# Patient Record
Sex: FEMALE | Race: WHITE | NOT HISPANIC OR LATINO | Employment: FULL TIME | URBAN - METROPOLITAN AREA
[De-identification: names, ages, dates, MRNs, and addresses within clinical notes are randomized per-mention and may not be internally consistent; named-entity substitution may affect disease eponyms.]

---

## 2024-02-19 ENCOUNTER — HOSPITAL ENCOUNTER (OUTPATIENT)
Dept: RADIOLOGY | Facility: HOSPITAL | Age: 64
Discharge: HOME | End: 2024-02-19
Payer: COMMERCIAL

## 2024-02-19 DIAGNOSIS — M16.11 UNILATERAL PRIMARY OSTEOARTHRITIS, RIGHT HIP: ICD-10-CM

## 2024-02-19 PROCEDURE — 73700 CT LOWER EXTREMITY W/O DYE: CPT | Mod: RT

## 2024-02-19 PROCEDURE — 73700 CT LOWER EXTREMITY W/O DYE: CPT | Mod: RIGHT SIDE | Performed by: RADIOLOGY

## 2024-05-30 ENCOUNTER — EDUCATION (OUTPATIENT)
Dept: ORTHOPEDIC SURGERY | Facility: CLINIC | Age: 64
End: 2024-05-30
Payer: COMMERCIAL

## 2024-05-30 NOTE — GROUP NOTE
In addition to the group class activities, Susannah CAMPA Sulmaalexandria had the following lab work completed:  No orders of the defined types were placed in this encounter.      A new History and Physical was not completed.    This class lasted approximately 1 hour and had 13 participants. The nurse instructor covered the following topics:  Overview of joint replacement surgery.  Instructions for at-home preparation for surgery (included below).  Expectations before and after surgery including hospital stay.  Discharge planning and home care options.  Physical therapy overview and review of at-home exercises.    Information for Surgery or Hospital Stay  For morning surgery, do not eat or drink after midnight. This includes water, mints, and chewing gum.  For afternoon surgery, you may have a clear liquid breakfast before 6 A.M. Clear liquids are anything you can see through, like apple juice, cranberry juice, tea or black coffee without cream. Do not eat or drink after 6 A.M. This includes water.  Wear loose fitting clothes--something that can be slipped on and off easily over a dressing.  Bring a walker or crutches with you to the hospital on the day of surgery.  Please inform the office if you have any symptoms of illness or fever prior to surgery.  You need to have transportation home when discharged, as you will be medicated.  STOP any aspirin or anti-inflammatory products (Aleve, Advil, etc.) 5-7 days before surgery.  You may use Tylenol or Extra Strength Tylenol.  STOP any vitamins or herbal products 10 days before surgery.

## 2024-06-03 ENCOUNTER — PRE-ADMISSION TESTING (OUTPATIENT)
Dept: PREADMISSION TESTING | Facility: HOSPITAL | Age: 64
End: 2024-06-03
Payer: COMMERCIAL

## 2024-06-03 VITALS
BODY MASS INDEX: 29.79 KG/M2 | OXYGEN SATURATION: 97 % | WEIGHT: 178.79 LBS | DIASTOLIC BLOOD PRESSURE: 86 MMHG | HEART RATE: 76 BPM | TEMPERATURE: 98.2 F | SYSTOLIC BLOOD PRESSURE: 137 MMHG | RESPIRATION RATE: 18 BRPM | HEIGHT: 65 IN

## 2024-06-03 DIAGNOSIS — Z01.818 PREOPERATIVE EXAMINATION: Primary | ICD-10-CM

## 2024-06-03 LAB
ABO GROUP (TYPE) IN BLOOD: NORMAL
ALBUMIN SERPL BCP-MCNC: 4.4 G/DL (ref 3.4–5)
ALP SERPL-CCNC: 94 U/L (ref 33–136)
ALT SERPL W P-5'-P-CCNC: 17 U/L (ref 7–45)
AMORPH CRY #/AREA UR COMP ASSIST: ABNORMAL /HPF
ANION GAP SERPL CALC-SCNC: 9 MMOL/L (ref 10–20)
ANTIBODY SCREEN: NORMAL
APPEARANCE UR: CLEAR
AST SERPL W P-5'-P-CCNC: 19 U/L (ref 9–39)
BACTERIA #/AREA URNS AUTO: ABNORMAL /HPF
BASOPHILS # BLD AUTO: 0.04 X10*3/UL (ref 0–0.1)
BASOPHILS NFR BLD AUTO: 0.5 %
BILIRUB SERPL-MCNC: 0.6 MG/DL (ref 0–1.2)
BILIRUB UR STRIP.AUTO-MCNC: NEGATIVE MG/DL
BUN SERPL-MCNC: 11 MG/DL (ref 6–23)
CALCIUM SERPL-MCNC: 9.3 MG/DL (ref 8.6–10.3)
CHLORIDE SERPL-SCNC: 101 MMOL/L (ref 98–107)
CO2 SERPL-SCNC: 31 MMOL/L (ref 21–32)
COLOR UR: ABNORMAL
CREAT SERPL-MCNC: 0.78 MG/DL (ref 0.5–1.05)
EGFRCR SERPLBLD CKD-EPI 2021: 87 ML/MIN/1.73M*2
EOSINOPHIL # BLD AUTO: 0.2 X10*3/UL (ref 0–0.7)
EOSINOPHIL NFR BLD AUTO: 2.7 %
ERYTHROCYTE [DISTWIDTH] IN BLOOD BY AUTOMATED COUNT: 12 % (ref 11.5–14.5)
GLUCOSE SERPL-MCNC: 122 MG/DL (ref 74–99)
GLUCOSE UR STRIP.AUTO-MCNC: NORMAL MG/DL
HCT VFR BLD AUTO: 40.3 % (ref 36–46)
HGB BLD-MCNC: 13.5 G/DL (ref 12–16)
IMM GRANULOCYTES # BLD AUTO: 0.02 X10*3/UL (ref 0–0.7)
IMM GRANULOCYTES NFR BLD AUTO: 0.3 % (ref 0–0.9)
KETONES UR STRIP.AUTO-MCNC: NEGATIVE MG/DL
LEUKOCYTE ESTERASE UR QL STRIP.AUTO: ABNORMAL
LYMPHOCYTES # BLD AUTO: 1.98 X10*3/UL (ref 1.2–4.8)
LYMPHOCYTES NFR BLD AUTO: 26.3 %
MCH RBC QN AUTO: 30.9 PG (ref 26–34)
MCHC RBC AUTO-ENTMCNC: 33.5 G/DL (ref 32–36)
MCV RBC AUTO: 92 FL (ref 80–100)
MONOCYTES # BLD AUTO: 0.68 X10*3/UL (ref 0.1–1)
MONOCYTES NFR BLD AUTO: 9 %
NEUTROPHILS # BLD AUTO: 4.62 X10*3/UL (ref 1.2–7.7)
NEUTROPHILS NFR BLD AUTO: 61.2 %
NITRITE UR QL STRIP.AUTO: NEGATIVE
NRBC BLD-RTO: 0 /100 WBCS (ref 0–0)
PH UR STRIP.AUTO: 6 [PH]
PLATELET # BLD AUTO: 254 X10*3/UL (ref 150–450)
POTASSIUM SERPL-SCNC: 3.8 MMOL/L (ref 3.5–5.3)
PROT SERPL-MCNC: 6.9 G/DL (ref 6.4–8.2)
PROT UR STRIP.AUTO-MCNC: NEGATIVE MG/DL
RBC # BLD AUTO: 4.37 X10*6/UL (ref 4–5.2)
RBC # UR STRIP.AUTO: NEGATIVE /UL
RBC #/AREA URNS AUTO: ABNORMAL /HPF
RH FACTOR (ANTIGEN D): NORMAL
SODIUM SERPL-SCNC: 137 MMOL/L (ref 136–145)
SP GR UR STRIP.AUTO: 1.01
SQUAMOUS #/AREA URNS AUTO: ABNORMAL /HPF
UROBILINOGEN UR STRIP.AUTO-MCNC: NORMAL MG/DL
WBC # BLD AUTO: 7.5 X10*3/UL (ref 4.4–11.3)
WBC #/AREA URNS AUTO: ABNORMAL /HPF

## 2024-06-03 PROCEDURE — 80053 COMPREHEN METABOLIC PANEL: CPT

## 2024-06-03 PROCEDURE — 85025 COMPLETE CBC W/AUTO DIFF WBC: CPT

## 2024-06-03 PROCEDURE — 81001 URINALYSIS AUTO W/SCOPE: CPT

## 2024-06-03 PROCEDURE — 87081 CULTURE SCREEN ONLY: CPT | Mod: 59,GEALAB

## 2024-06-03 PROCEDURE — 36415 COLL VENOUS BLD VENIPUNCTURE: CPT

## 2024-06-03 PROCEDURE — 87086 URINE CULTURE/COLONY COUNT: CPT | Mod: GEALAB

## 2024-06-03 PROCEDURE — 93010 ELECTROCARDIOGRAM REPORT: CPT | Performed by: INTERNAL MEDICINE

## 2024-06-03 PROCEDURE — 93005 ELECTROCARDIOGRAM TRACING: CPT

## 2024-06-03 PROCEDURE — 86901 BLOOD TYPING SEROLOGIC RH(D): CPT

## 2024-06-03 RX ORDER — BISMUTH SUBSALICYLATE 262 MG
1 TABLET,CHEWABLE ORAL DAILY
COMMUNITY

## 2024-06-03 RX ORDER — PANTOPRAZOLE SODIUM 40 MG/1
40 TABLET, DELAYED RELEASE ORAL
COMMUNITY

## 2024-06-03 RX ORDER — CHLORHEXIDINE GLUCONATE ORAL RINSE 1.2 MG/ML
15 SOLUTION DENTAL DAILY
Qty: 30 ML | Refills: 0 | Status: SHIPPED | OUTPATIENT
Start: 2024-06-03 | End: 2024-06-05

## 2024-06-03 RX ORDER — CHLORHEXIDINE GLUCONATE 40 MG/ML
1 SOLUTION TOPICAL DAILY
Start: 2024-06-03 | End: 2024-06-08

## 2024-06-03 RX ORDER — OMEPRAZOLE 20 MG/1
20 CAPSULE, DELAYED RELEASE ORAL NIGHTLY
COMMUNITY

## 2024-06-03 ASSESSMENT — ENCOUNTER SYMPTOMS
GASTROINTESTINAL NEGATIVE: 1
EYES NEGATIVE: 1
ENDOCRINE NEGATIVE: 1
CARDIOVASCULAR NEGATIVE: 1
CONSTITUTIONAL NEGATIVE: 1
RHINORRHEA: 1
RESPIRATORY NEGATIVE: 1
NEUROLOGICAL NEGATIVE: 1

## 2024-06-03 ASSESSMENT — DUKE ACTIVITY SCORE INDEX (DASI)
CAN YOU DO LIGHT WORK AROUND THE HOUSE LIKE DUSTING OR WASHING DISHES: YES
CAN YOU DO YARD WORK LIKE RAKING LEAVES, WEEDING OR PUSHING A MOWER: YES
CAN YOU PARTICIPATE IN MODERATE RECREATIONAL ACTIVITIES LIKE GOLF, BOWLING, DANCING, DOUBLES TENNIS OR THROWING A BASEBALL OR FOOTBALL: NO
CAN YOU HAVE SEXUAL RELATIONS: YES
CAN YOU TAKE CARE OF YOURSELF (EAT, DRESS, BATHE, OR USE TOILET): YES
TOTAL_SCORE: 44.7
CAN YOU RUN A SHORT DISTANCE: YES
CAN YOU CLIMB A FLIGHT OF STAIRS OR WALK UP A HILL: YES
CAN YOU DO MODERATE WORK AROUND THE HOUSE LIKE VACUUMING, SWEEPING FLOORS OR CARRYING GROCERIES: YES
CAN YOU PARTICIPATE IN STRENOUS SPORTS LIKE SWIMMING, SINGLES TENNIS, FOOTBALL, BASKETBALL, OR SKIING: NO
CAN YOU WALK INDOORS, SUCH AS AROUND YOUR HOUSE: YES
CAN YOU DO HEAVY WORK AROUND THE HOUSE LIKE SCRUBBING FLOORS OR LIFTING AND MOVING HEAVY FURNITURE: YES
DASI METS SCORE: 8.2
CAN YOU WALK A BLOCK OR TWO ON LEVEL GROUND: YES

## 2024-06-03 ASSESSMENT — ACTIVITIES OF DAILY LIVING (ADL): ADL_SCORE: 0

## 2024-06-03 ASSESSMENT — PAIN - FUNCTIONAL ASSESSMENT: PAIN_FUNCTIONAL_ASSESSMENT: 0-10

## 2024-06-03 ASSESSMENT — LIFESTYLE VARIABLES: SMOKING_STATUS: NONSMOKER

## 2024-06-03 ASSESSMENT — PAIN SCALES - GENERAL: PAINLEVEL_OUTOF10: 0 - NO PAIN

## 2024-06-03 NOTE — H&P (VIEW-ONLY)
CPM/PAT Evaluation       Name: Susannah Avalos (Susannah Avalos)  /Age: 1960/63 y.o.     Visit Type:   In-Person       Chief Complaint: osteoarthritis of right hip    HPI  64 y/o female scheduled for Robot arthroplasty total hip - right on 2024 with  Dr. Santillan secondary to osteoarthritis.  PMHX includes osteoarthritis, GERD.  PAT is consulted today for perioperative risk stratification and optimization.      Past Medical History:   Diagnosis Date    Arthritis     CKD (chronic kidney disease) stage 2, GFR 60-89 ml/min     GERD (gastroesophageal reflux disease)     Hyperlipidemia     PONV (postoperative nausea and vomiting)     Vitamin D deficiency     Wears contact lenses     Wears glasses        Past Surgical History:   Procedure Laterality Date     SECTION, LOW TRANSVERSE      COLONOSCOPY      X2       Patient  has no history on file for sexual activity.    No family history on file.    No Known Allergies    Prior to Admission medications    Not on File        PAT ROS:   Constitutional:   neg    Neuro/Psych:   neg    Eyes:   neg    Ears:   Nose:    nasal discharge  Mouth:   neg    Throat:   neg    Neck:   Cardio:   neg    Respiratory:   neg    Endocrine:   neg    GI:   neg    :   neg    Musculoskeletal:    Pain in right hip  Hematologic:   neg    Skin:  neg        Physical Exam  Constitutional:       Appearance: Normal appearance.   HENT:      Head: Normocephalic and atraumatic.      Mouth/Throat:      Mouth: Mucous membranes are moist.      Pharynx: Oropharynx is clear.   Eyes:      Extraocular Movements: Extraocular movements intact.      Pupils: Pupils are equal, round, and reactive to light.   Cardiovascular:      Rate and Rhythm: Normal rate and regular rhythm.   Pulmonary:      Effort: Pulmonary effort is normal.      Breath sounds: Normal breath sounds.   Abdominal:      General: Bowel sounds are normal.      Palpations: Abdomen is soft.   Musculoskeletal:         General: Normal  range of motion.      Cervical back: Normal range of motion and neck supple.   Skin:     General: Skin is warm and dry.   Neurological:      General: No focal deficit present.      Mental Status: She is alert and oriented to person, place, and time.   Psychiatric:         Mood and Affect: Mood normal.         Behavior: Behavior normal.          PAT AIRWAY:   Airway:     Mallampati::  II    Neck ROM::  Full  normal      06/03/24    Visit Vitals  /86   Pulse 76   Temp 36.8 °C (98.2 °F)   Resp 18       DASI Risk Score      Flowsheet Row Most Recent Value   DASI SCORE 44.7   METS Score (Will be calculated only when all the questions are answered) 8.2          Caprini DVT Assessment      Flowsheet Row Most Recent Value   DVT Score 6   Current Status Major surgery planned, lasting 2-3 hours   Age 60-75 years   BMI 30 or less          Modified Frailty Index    No data to display       CHADS2 Stroke Risk  Current as of 41 minutes ago        N/A 3 to 100%: High Risk   2 to < 3%: Medium Risk   0 to < 2%: Low Risk     Last Change: N/A          This score determines the patient's risk of having a stroke if the patient has atrial fibrillation.        This score is not applicable to this patient. Components are not calculated.          Revised Cardiac Risk Index      Flowsheet Row Most Recent Value   Revised Cardiac Risk Calculator 0          Apfel Simplified Score      Flowsheet Row Most Recent Value   Apfel Simplified Score Calculator 4          Risk Analysis Index Results This Encounter         6/3/2024  1337             CASTRO Cancer History: Patient does not indicate history of cancer    Total Risk Analysis Index Score Without Cancer: 18    Total Risk Analysis Index Score: 18          Stop Bang Score      Flowsheet Row Most Recent Value   Do you snore loudly? 1   Do you often feel tired or fatigued after your sleep? 0   Has anyone ever observed you stop breathing in your sleep? 0   Do you have or are you being treated for  high blood pressure? 0   Recent BMI (Calculated) 0   Age older than 50 years old? 1=Yes   Is your neck circumference greater than 17 inches (Male) or 16 inches (Female)? 0   Gender - Male 0=No                Assessment and Plan:     HPI  62 y/o female scheduled for Robot arthroplasty total hip - right on 6/17/2024 with  Dr. Santillan secondary to osteoarthritis .  PMHX includes osteoarthritis, GERD.  PAT is consulted today for perioperative risk stratification and optimization.      Neuro:  No neurologic diagnosis or significant findings on chart review, clinical presentation and evaluation.  No grossly apparent neurologic perioperative risk.  Patient is not at increased risk for perioperative CVA      HEENT:  No HEENT diagnosis or significant findings on chart review or clinical presentation and evaluation. No further preoperative testing/intervention indicated at this time.    Cardiovascular:  No CV diagnosis or significant findings on chart review or clinical presentation and evaluation. No further preoperative testing or intervention is indicated at this time.  METS: 8.2  RCRI: 0 points, 3.9%  risk for postoperative MACE   ROB: 0.0% risk for 30 day postoperative MACE  EKG - as above    Pulmonary:  No pulmonary diagnosis, however patient is at increased risk of perioperative complications secondary to  age > 60  Stop Bang score is 0 placing patient at low risk for IGNACIA  ARISCAT: <26 points, 1.6% risk of in-hospital postoperative pulmonary complication  PRODIGY: Low risk for opioid induced respiratory depression    Renal:   No renal diagnosis, however patient is at increase risk for perioperative renal complications secondary to  Age equal to or greater than 56      Endocrine:  No endocrine diagnosis or significant findings on chart review or clinical presentation and evaluation. No further testing or intervention is indicated at this time.    Hematologic:  No hematologic diagnosis, however patient is at an increased  risk for DVT    Caprini Score 6, patient at High risk for perioperative DVT.  Patient provided with VTE education/handout.    Gastrointestinal:   No GI diagnosis or significant findings on chart review or clinical presentation and evaluation.   Apfel 4      Ortho:  Follows with Dr. Santillan  CT Hip: 2/20/2024  Plan is for Roel Robot Arthroplasty total hip anterior - Right     Infectious disease:   No infectious diagnosis or significant findings on chart review or clinical presentation and evaluation.   Prescription provided for CHG body wash and dental rinse. CHG use instructions reviewed and provided to patient.  Staph screen collected    Musculoskeletal:   No diagnosis or significant findings on chart review or clinical presentation and evaluation.     Anesthesia/Airway:  PONV      Medication instructions and NPO guidelines reviewed with the patient.  All questions or concerns discussed and addressed.      Labs and EKG ordered   BMP and CBC w/diff

## 2024-06-03 NOTE — PREPROCEDURE INSTRUCTIONS
Thank you for visiting Preadmission Testing (PAT) today for your pre-procedure evaluation, you were seen by     Mandy Singer CNP  Pre Admission Testing  [facility]  [phone]    This summary includes instructions and information to aid you during your perioperative period.  Please read carefully. If you have any questions about your visit today, please call the number listed above.  If you become ill or have any changes to your health before your surgery, please contact your primary care provider and alert your surgeon.    Preparing for your Surgery       Exercises  Preoperative Deep Breathing Exercises  Why it is important to do deep breathing exercises before my surgery?  Deep breathing exercises strengthen your breathing muscles.  This helps you to recover after your surgery and decreases the chance of breathing complications.  How are the deep breathing exercises done?  Sit straight with your back supported.  Breathe in deeply and slowly through your nose. Your lower rib cage should expand and your abdomen may move forward.  Hold that breath for 3 to 5 seconds.  Breathe out through pursed lips, slowly and completely.  Rest and repeat 10 times every hour while awake.  Rest longer if you become dizzy or lightheaded.       Preoperative Brain Exercises    What are brain exercises?  A brain exercise is any activity that engages your thinking (cognitive) skills.    What types of activities are considered brain exercises?  Jigsaw puzzles, crossword puzzles, word jumble, memory games, word search, and many more.  Many can be found free online or on your phone via a mobile kg.    Why should I do brain exercises before my surgery?  More recent research has shown brain exercise before surgery can lower the risk of postoperative delirium (confusion) which can be especially important for older adults.  Patients who did brain exercises for 5 to 10 hours the days before surgery, cut their risk  of postoperative delirium in half up to 1 week after surgery.    Sit-to-Stand Exercise    What is the sit-to-stand exercise?  The sit-to-stand exercise strengthens the muscles of your lower body and muscles in the center of your body (core muscles for stability) helping to maintain and improve your strength and mobility.  How do I do the sit-to-stand exercise?  The goal is to do this exercise without using your arms or hands.  If this is too difficult, use your arms and hands or a chair with armrests to help slowly push yourself to the standing position and lower yourself back to the sitting position. As the movement becomes easier use your arms and hands less.    Steps to the sit-to-stand exercise  Sit up tall in a sturdy chair, knees bent, feet flat on the floor shoulder-width apart.  Shift your hips/pelvis forward in the chair to correctly position yourself for the next movement.  Lean forward at your hips.  Stand up straight putting equal weight on both feet.  Check to be sure you are properly aligned with the chair, in a slow controlled movement sit back down.  Repeat this exercise 10-15 times.  If needed you can do it fewer times until your strength improves.  Rest for 1 minute.  Do another 10-15 sit-to-stand exercises.  Try to do this in the morning and evening.        Instructions    Preoperative Fasting Guidelines    Why must I stop eating and drinking near surgery time?  With sedation, food or liquid in your stomach can enter your lungs causing serious complications  Food can increase nausea and vomiting  When do I need to stop eating and drinking before my surgery?      Do not eat any food or drink any liquids after midnight the night before your surgery/procedure.  You may have sips of water to take medications.            Simple things you can do to help prevent blood clots     Blood clots are blockages that can form in the body's veins. When a blood clot forms in your deep veins, it may be called a  deep vein thrombosis, or DVT for short. Blood clots can happen in any part of the body where blood flows, but they are most common in the arms and legs. If a piece of a blood clot breaks free and travels to the lungs, it is called a pulmonary embolus (PE). A PE can be a very serious problem.         Being in the hospital or having surgery can raise your chances of getting a blood clot because you may not be well enough to move around as much as you normally do.         Ways you can help prevent blood clots in the hospital       Wearing SCDs  SCDs stands for Sequential Compression Devices.   SCDs are special sleeves that wrap around your legs. They attach to a pump that fills them with air to gently squeeze your legs every few minutes.  This helps return the blood in your legs to your heart.   SCDs should only be taken off when walking or bathing. SCDs may not be comfortable, but they can help save your life.              Pump SCD leg sleeves  Wearing compression stockings - if your doctor orders them. These special snug-fitting stockings gently squeeze your legs to help blood flow.       Walking. Walking helps move the blood in your legs.   If your doctor says it is ok, try walking the halls at least   5 times a day. Ask us to help you get up, so you don't fall.      Taking any blood-thinning medicines your doctor orders.              Ways you can help prevent blood clots at home         Wearing compression stockings - if your doctor orders them.   Walking - to help move the blood in your legs.    Taking any blood-thinning medicines your doctor orders.      Signs of a blood clot or PE    Tell your doctor or nurse right away if you have any of the problems listed below.         If you are at home, seek medical care right away. Call 911 for chest pain or problems breathing.            Signs of a blood clot (DVT) - such as pain, swelling, redness, or warmth in your arm or legs.  Signs of a pulmonary embolism (PE) -  "such as chest pain or feeling short of breath      Tobacco and Alcohol;  Do not drink alcohol or smoke within 24 hours of surgery.  It is best to quit smoking for as long as possible before any surgery or procedure.    Other Instructions  Why did I have my nose, under my arms, and groin swabbed? The purpose of the swab is to identify Staphylococcus aureus inside your nose or on your skin.  The swab was sent to the laboratory for culture.  A positive swab/culture for Staphylococcus aureus is called colonization or carriage.     What is Staphylococcus aureus? Staphylococcus aureus, also known as \"staph\", is a germ found on the skin or in the nose of healthy people.  Sometimes Staphylococcus aureus can get into the body and cause an infection.  This can be minor (such as pimples, boils, or other skin problems).  It might also be serious (such as a blood infection, pneumonia, or a surgical site infection).     What is Staphylococcus aureus colonization or carriage? Colonization or carriage means that a person has the germ but is not sick from it.  These bacteria can be spread on the hands or when breathing or sneezing.   How is Staphylococcus aureus spread? It is most often spread by close contact with a person or item that carries it.   What happens if my culture is positive for Staphylococcus aureus? Your doctor/medical team will use this information to guide any antibiotic treatment which may be necessary.  Regardless of the culture results, we will clean the inside of your nose with a betadine swab just before you have your surgery.   Will I get an infection if I have Staphylococcus aureus in my nose or on my skin? Anyone can get an infection with Staphylococcus aureus.  However, the best way to reduce your risk of infection is to follow the instructions provided to you for the use of your CHG soap and dental rinse.      Body Wash:     What is a home preoperative antibacterial shower? This shower is a way of " cleaning the skin with a germ-killing solution before surgery.  The solution contains chlorhexidine, commonly known as CHG.  CHG is a skin cleanser with germ-killing ability.  Let your doctor know if you are allergic to chlorhexidine.   Why do I need to take a preoperative antibacterial shower? Skin is not sterile.  It is best to try to make your skin as free of germs as possible before surgery.  Proper cleansing with a germ-killing soap before surgery can lower the number of germs on your skin.  This helps to reduce the risk of infection at the surgical site.    Following the instructions listed below will help you prepare your skin for surgery.   How do I use the solution? Steps:  Begin using your CHG soap 5 days before your scheduled surgery on __6/17/2024___.          Oral/Dental Rinse:     What is oral/dental rinse?  It is a mouthwash. It is a way of cleaning the mouth with a germ-killing solution before your surgery.  The solution contains chlorhexidine, commonly known as CHG. It is used inside the mouth to kill a bacteria known as Staphylococcus aureus.  Let your doctor know if you are allergic to Chlorhexidine.   Why do I need to use CHG oral/dental rinse? The CHG oral/dental rinse helps to kill a bacteria in your mouth known as Staphylococcus aureus.  This reduces the risk of infection at the surgical site.    Using your CHG oral/dental rinse STEPS: Use your CHG oral/dental rinse after you brush your teeth the night before (at bedtime) and the morning of your surgery.  Follow all directions on your prescription label.    Use the cap on the container to measure 15 ml.  Swish (gargle if you can) the mouthwash in your mouth for at least 30 seconds, (do not swallow) and spit out.  After you use your CHG rinse, do not rinse your mouth with water, drink or eat.    Please refer to the prescription label for the appropriate time to resume oral intake   What side effects might I have using the CHG oral/dental rinse?  CHG rinse will stick to plaque on the teeth.  Brush and floss just before use.  Teeth brushing will help avoid staining of plaque during use.          The Week before Surgery        Seven days before Surgery  Check your PAT medication instructions  Do the exercises provided to you by PAT  Arrange for a responsible, adult licensed  to take you home after surgery and stay with you for 24 hours.  You will not be permitted to drive yourself home if you have received any anesthetic/sedation  Six days before surgery  Check your PAT medication instructions  Do the exercises provided to you by PAT  Start using Chlorhexidene (CHG) body wash if prescribed  Five days before surgery  Check your PAT medication instructions  Do the exercises provided to you by PAT  Continue to use CHG body wash if prescribed  Three days before surgery  Check your PAT medication instructions  Do the exercises provided to you by PAT  Continue to use CHG body wash if prescribed  Two days before surgery  Check your PAT medication instructions  Do the exercises provided to you by PAT  Continue to use CHG body wash if prescribed    The Day before Surgery       Check your PAT medication and all other PAT instructions including when to stop eating and drinking  You will be called with your arrival time for surgery in the late afternoon.  If you do not receive a call please reach out to your surgeon's office.  Do not smoke or drink 24 hours before surgery  Prepare items to bring with you to the hospital  Shower with your chlorhexidine wash if prescribed  Brush your teeth and use your chlorhexidine dental rinse if prescribed    The Day of Surgery       Check your PAT medication instructions  Ensure you follow the instructions for when to stop eating and drinking  Shower, if prescribed use CHG.  Do not apply any lotions, creams, moisturizers, perfume or deodorant  Brush your teeth and use your CHG dental rinse if prescribed  Wear loose comfortable  clothing  Avoid make-up  Remove  jewelry and piercings, consider professional piercing removal with a plastic spacer if needed  Bring photo ID and Insurance card  Bring an accurate medication list that includes medication dose, frequency and allergies  Bring a copy of your advanced directives (will, health care power of )  Bring any devices and controllers as well as medical devices you have been provided with for surgery (CPAP, slings, braces, etc.)  Dentures, eyeglasses, and contacts will be removed before surgery, please bring cases for contacts or glasses

## 2024-06-03 NOTE — CPM/PAT H&P
CPM/PAT Evaluation       Name: Susannah Avalos (Susannah Avalos)  /Age: 1960/63 y.o.     Visit Type:   In-Person       Chief Complaint: osteoarthritis of right hip    HPI  62 y/o female scheduled for Robot arthroplasty total hip - right on 2024 with  Dr. Santillan secondary to osteoarthritis.  PMHX includes osteoarthritis, GERD.  PAT is consulted today for perioperative risk stratification and optimization.      Past Medical History:   Diagnosis Date    Arthritis     CKD (chronic kidney disease) stage 2, GFR 60-89 ml/min     GERD (gastroesophageal reflux disease)     Hyperlipidemia     PONV (postoperative nausea and vomiting)     Vitamin D deficiency     Wears contact lenses     Wears glasses        Past Surgical History:   Procedure Laterality Date     SECTION, LOW TRANSVERSE      COLONOSCOPY      X2       Patient  has no history on file for sexual activity.    No family history on file.    No Known Allergies    Prior to Admission medications    Not on File        PAT ROS:   Constitutional:   neg    Neuro/Psych:   neg    Eyes:   neg    Ears:   Nose:    nasal discharge  Mouth:   neg    Throat:   neg    Neck:   Cardio:   neg    Respiratory:   neg    Endocrine:   neg    GI:   neg    :   neg    Musculoskeletal:    Pain in right hip  Hematologic:   neg    Skin:  neg        Physical Exam  Constitutional:       Appearance: Normal appearance.   HENT:      Head: Normocephalic and atraumatic.      Mouth/Throat:      Mouth: Mucous membranes are moist.      Pharynx: Oropharynx is clear.   Eyes:      Extraocular Movements: Extraocular movements intact.      Pupils: Pupils are equal, round, and reactive to light.   Cardiovascular:      Rate and Rhythm: Normal rate and regular rhythm.   Pulmonary:      Effort: Pulmonary effort is normal.      Breath sounds: Normal breath sounds.   Abdominal:      General: Bowel sounds are normal.      Palpations: Abdomen is soft.   Musculoskeletal:         General: Normal  range of motion.      Cervical back: Normal range of motion and neck supple.   Skin:     General: Skin is warm and dry.   Neurological:      General: No focal deficit present.      Mental Status: She is alert and oriented to person, place, and time.   Psychiatric:         Mood and Affect: Mood normal.         Behavior: Behavior normal.          PAT AIRWAY:   Airway:     Mallampati::  II    Neck ROM::  Full  normal      06/03/24    Visit Vitals  /86   Pulse 76   Temp 36.8 °C (98.2 °F)   Resp 18       DASI Risk Score      Flowsheet Row Most Recent Value   DASI SCORE 44.7   METS Score (Will be calculated only when all the questions are answered) 8.2          Caprini DVT Assessment      Flowsheet Row Most Recent Value   DVT Score 6   Current Status Major surgery planned, lasting 2-3 hours   Age 60-75 years   BMI 30 or less          Modified Frailty Index    No data to display       CHADS2 Stroke Risk  Current as of 41 minutes ago        N/A 3 to 100%: High Risk   2 to < 3%: Medium Risk   0 to < 2%: Low Risk     Last Change: N/A          This score determines the patient's risk of having a stroke if the patient has atrial fibrillation.        This score is not applicable to this patient. Components are not calculated.          Revised Cardiac Risk Index      Flowsheet Row Most Recent Value   Revised Cardiac Risk Calculator 0          Apfel Simplified Score      Flowsheet Row Most Recent Value   Apfel Simplified Score Calculator 4          Risk Analysis Index Results This Encounter         6/3/2024  1337             CASTRO Cancer History: Patient does not indicate history of cancer    Total Risk Analysis Index Score Without Cancer: 18    Total Risk Analysis Index Score: 18          Stop Bang Score      Flowsheet Row Most Recent Value   Do you snore loudly? 1   Do you often feel tired or fatigued after your sleep? 0   Has anyone ever observed you stop breathing in your sleep? 0   Do you have or are you being treated for  high blood pressure? 0   Recent BMI (Calculated) 0   Age older than 50 years old? 1=Yes   Is your neck circumference greater than 17 inches (Male) or 16 inches (Female)? 0   Gender - Male 0=No                Assessment and Plan:     HPI  64 y/o female scheduled for Robot arthroplasty total hip - right on 6/17/2024 with  Dr. Santillan secondary to osteoarthritis .  PMHX includes osteoarthritis, GERD.  PAT is consulted today for perioperative risk stratification and optimization.      Neuro:  No neurologic diagnosis or significant findings on chart review, clinical presentation and evaluation.  No grossly apparent neurologic perioperative risk.  Patient is not at increased risk for perioperative CVA      HEENT:  No HEENT diagnosis or significant findings on chart review or clinical presentation and evaluation. No further preoperative testing/intervention indicated at this time.    Cardiovascular:  No CV diagnosis or significant findings on chart review or clinical presentation and evaluation. No further preoperative testing or intervention is indicated at this time.  METS: 8.2  RCRI: 0 points, 3.9%  risk for postoperative MACE   ROB: 0.0% risk for 30 day postoperative MACE  EKG - as above    Pulmonary:  No pulmonary diagnosis, however patient is at increased risk of perioperative complications secondary to  age > 60  Stop Bang score is 0 placing patient at low risk for IGNACIA  ARISCAT: <26 points, 1.6% risk of in-hospital postoperative pulmonary complication  PRODIGY: Low risk for opioid induced respiratory depression    Renal:   No renal diagnosis, however patient is at increase risk for perioperative renal complications secondary to  Age equal to or greater than 56      Endocrine:  No endocrine diagnosis or significant findings on chart review or clinical presentation and evaluation. No further testing or intervention is indicated at this time.    Hematologic:  No hematologic diagnosis, however patient is at an increased  risk for DVT    Caprini Score 6, patient at High risk for perioperative DVT.  Patient provided with VTE education/handout.    Gastrointestinal:   No GI diagnosis or significant findings on chart review or clinical presentation and evaluation.   Apfel 4      Ortho:  Follows with Dr. Santillan  CT Hip: 2/20/2024  Plan is for Roel Robot Arthroplasty total hip anterior - Right     Infectious disease:   No infectious diagnosis or significant findings on chart review or clinical presentation and evaluation.   Prescription provided for CHG body wash and dental rinse. CHG use instructions reviewed and provided to patient.  Staph screen collected    Musculoskeletal:   No diagnosis or significant findings on chart review or clinical presentation and evaluation.     Anesthesia/Airway:  PONV      Medication instructions and NPO guidelines reviewed with the patient.  All questions or concerns discussed and addressed.      Labs and EKG ordered   BMP and CBC w/diff

## 2024-06-04 LAB
BACTERIA UR CULT: NORMAL
HOLD SPECIMEN: NORMAL

## 2024-06-05 LAB — STAPHYLOCOCCUS SPEC CULT: NORMAL

## 2024-06-06 LAB
ATRIAL RATE: 76 BPM
P AXIS: 51 DEGREES
P OFFSET: 206 MS
P ONSET: 154 MS
PR INTERVAL: 138 MS
Q ONSET: 223 MS
QRS COUNT: 13 BEATS
QRS DURATION: 82 MS
QT INTERVAL: 382 MS
QTC CALCULATION(BAZETT): 429 MS
QTC FREDERICIA: 413 MS
R AXIS: 2 DEGREES
T AXIS: 26 DEGREES
T OFFSET: 414 MS
VENTRICULAR RATE: 76 BPM

## 2024-06-12 ENCOUNTER — ANESTHESIA EVENT (OUTPATIENT)
Dept: OPERATING ROOM | Facility: HOSPITAL | Age: 64
End: 2024-06-12
Payer: COMMERCIAL

## 2024-06-14 ENCOUNTER — TELEPHONE (OUTPATIENT)
Dept: INPATIENT UNIT | Facility: HOSPITAL | Age: 64
End: 2024-06-14
Payer: COMMERCIAL

## 2024-06-17 ENCOUNTER — DOCUMENTATION (OUTPATIENT)
Dept: HOME HEALTH SERVICES | Facility: HOME HEALTH | Age: 64
End: 2024-06-17
Payer: COMMERCIAL

## 2024-06-17 ENCOUNTER — HOME HEALTH ADMISSION (OUTPATIENT)
Dept: HOME HEALTH SERVICES | Facility: HOME HEALTH | Age: 64
End: 2024-06-17
Payer: COMMERCIAL

## 2024-06-17 ENCOUNTER — HOSPITAL ENCOUNTER (OUTPATIENT)
Facility: HOSPITAL | Age: 64
Setting detail: OUTPATIENT SURGERY
Discharge: HOME HEALTH | End: 2024-06-17
Attending: ORTHOPAEDIC SURGERY | Admitting: ORTHOPAEDIC SURGERY
Payer: COMMERCIAL

## 2024-06-17 ENCOUNTER — PHARMACY VISIT (OUTPATIENT)
Dept: PHARMACY | Facility: CLINIC | Age: 64
End: 2024-06-17
Payer: MEDICARE

## 2024-06-17 ENCOUNTER — ANESTHESIA (OUTPATIENT)
Dept: OPERATING ROOM | Facility: HOSPITAL | Age: 64
End: 2024-06-17
Payer: COMMERCIAL

## 2024-06-17 ENCOUNTER — APPOINTMENT (OUTPATIENT)
Dept: RADIOLOGY | Facility: HOSPITAL | Age: 64
End: 2024-06-17
Payer: COMMERCIAL

## 2024-06-17 VITALS
TEMPERATURE: 96.8 F | BODY MASS INDEX: 28.69 KG/M2 | WEIGHT: 172.18 LBS | SYSTOLIC BLOOD PRESSURE: 154 MMHG | DIASTOLIC BLOOD PRESSURE: 81 MMHG | RESPIRATION RATE: 18 BRPM | HEART RATE: 87 BPM | HEIGHT: 65 IN | OXYGEN SATURATION: 94 %

## 2024-06-17 DIAGNOSIS — M16.11 UNILATERAL PRIMARY OSTEOARTHRITIS, RIGHT HIP: ICD-10-CM

## 2024-06-17 DIAGNOSIS — Z96.641 STATUS POST TOTAL HIP REPLACEMENT, RIGHT: Primary | ICD-10-CM

## 2024-06-17 LAB
ABO GROUP (TYPE) IN BLOOD: NORMAL
RH FACTOR (ANTIGEN D): NORMAL

## 2024-06-17 PROCEDURE — 3700000002 HC GENERAL ANESTHESIA TIME - EACH INCREMENTAL 1 MINUTE: Performed by: ORTHOPAEDIC SURGERY

## 2024-06-17 PROCEDURE — 7100000002 HC RECOVERY ROOM TIME - EACH INCREMENTAL 1 MINUTE: Performed by: ORTHOPAEDIC SURGERY

## 2024-06-17 PROCEDURE — C1776 JOINT DEVICE (IMPLANTABLE): HCPCS | Performed by: ORTHOPAEDIC SURGERY

## 2024-06-17 PROCEDURE — 72170 X-RAY EXAM OF PELVIS: CPT | Performed by: RADIOLOGY

## 2024-06-17 PROCEDURE — 2780000003 HC OR 278 NO HCPCS: Performed by: ORTHOPAEDIC SURGERY

## 2024-06-17 PROCEDURE — 3700000001 HC GENERAL ANESTHESIA TIME - INITIAL BASE CHARGE: Performed by: ORTHOPAEDIC SURGERY

## 2024-06-17 PROCEDURE — 7100000001 HC RECOVERY ROOM TIME - INITIAL BASE CHARGE: Performed by: ORTHOPAEDIC SURGERY

## 2024-06-17 PROCEDURE — 2720000007 HC OR 272 NO HCPCS: Performed by: ORTHOPAEDIC SURGERY

## 2024-06-17 PROCEDURE — 97110 THERAPEUTIC EXERCISES: CPT | Mod: GP

## 2024-06-17 PROCEDURE — A6213 FOAM DRG >16<=48 SQ IN W/BDR: HCPCS | Performed by: ORTHOPAEDIC SURGERY

## 2024-06-17 PROCEDURE — 3600000017 HC OR TIME - EACH INCREMENTAL 1 MINUTE - PROCEDURE LEVEL SIX: Performed by: ORTHOPAEDIC SURGERY

## 2024-06-17 PROCEDURE — A9999 DME SUPPLY OR ACCESSORY, NOS: HCPCS | Performed by: ORTHOPAEDIC SURGERY

## 2024-06-17 PROCEDURE — 7100000010 HC PHASE TWO TIME - EACH INCREMENTAL 1 MINUTE: Performed by: ORTHOPAEDIC SURGERY

## 2024-06-17 PROCEDURE — 97162 PT EVAL MOD COMPLEX 30 MIN: CPT | Mod: GP

## 2024-06-17 PROCEDURE — C1713 ANCHOR/SCREW BN/BN,TIS/BN: HCPCS | Performed by: ORTHOPAEDIC SURGERY

## 2024-06-17 PROCEDURE — 2500000004 HC RX 250 GENERAL PHARMACY W/ HCPCS (ALT 636 FOR OP/ED): Performed by: ANESTHESIOLOGY

## 2024-06-17 PROCEDURE — 7100000009 HC PHASE TWO TIME - INITIAL BASE CHARGE: Performed by: ORTHOPAEDIC SURGERY

## 2024-06-17 PROCEDURE — RXMED WILLOW AMBULATORY MEDICATION CHARGE

## 2024-06-17 PROCEDURE — 3600000018 HC OR TIME - INITIAL BASE CHARGE - PROCEDURE LEVEL SIX: Performed by: ORTHOPAEDIC SURGERY

## 2024-06-17 PROCEDURE — 2500000004 HC RX 250 GENERAL PHARMACY W/ HCPCS (ALT 636 FOR OP/ED): Performed by: NURSE ANESTHETIST, CERTIFIED REGISTERED

## 2024-06-17 PROCEDURE — 2500000004 HC RX 250 GENERAL PHARMACY W/ HCPCS (ALT 636 FOR OP/ED): Performed by: ORTHOPAEDIC SURGERY

## 2024-06-17 PROCEDURE — 2500000005 HC RX 250 GENERAL PHARMACY W/O HCPCS

## 2024-06-17 PROCEDURE — 36415 COLL VENOUS BLD VENIPUNCTURE: CPT | Performed by: ORTHOPAEDIC SURGERY

## 2024-06-17 PROCEDURE — 72170 X-RAY EXAM OF PELVIS: CPT

## 2024-06-17 DEVICE — IMPLANTABLE DEVICE: Type: IMPLANTABLE DEVICE | Site: HIP | Status: FUNCTIONAL

## 2024-06-17 DEVICE — CERAMIC V40 FEMORAL HEAD
Type: IMPLANTABLE DEVICE | Site: HIP | Status: FUNCTIONAL
Brand: BIOLOX

## 2024-06-17 RX ORDER — PANTOPRAZOLE SODIUM 40 MG/1
40 TABLET, DELAYED RELEASE ORAL
Status: CANCELLED | OUTPATIENT
Start: 2024-06-17

## 2024-06-17 RX ORDER — PROPOFOL 10 MG/ML
INJECTION, EMULSION INTRAVENOUS CONTINUOUS PRN
Status: DISCONTINUED | OUTPATIENT
Start: 2024-06-17 | End: 2024-06-17

## 2024-06-17 RX ORDER — GABAPENTIN 300 MG/1
300 CAPSULE ORAL 3 TIMES DAILY PRN
Status: CANCELLED | OUTPATIENT
Start: 2024-06-17

## 2024-06-17 RX ORDER — DOCUSATE SODIUM 100 MG/1
100 CAPSULE, LIQUID FILLED ORAL 2 TIMES DAILY
Status: CANCELLED | OUTPATIENT
Start: 2024-06-17

## 2024-06-17 RX ORDER — CARISOPRODOL 350 MG/1
350 TABLET ORAL 3 TIMES DAILY PRN
Qty: 42 TABLET | Refills: 0 | Status: SHIPPED | OUTPATIENT
Start: 2024-06-17

## 2024-06-17 RX ORDER — ONDANSETRON HYDROCHLORIDE 2 MG/ML
4 INJECTION, SOLUTION INTRAVENOUS ONCE AS NEEDED
Status: DISCONTINUED | OUTPATIENT
Start: 2024-06-17 | End: 2024-06-17 | Stop reason: HOSPADM

## 2024-06-17 RX ORDER — OXYCODONE HYDROCHLORIDE 5 MG/1
5 TABLET ORAL EVERY 6 HOURS PRN
Status: CANCELLED | OUTPATIENT
Start: 2024-06-17

## 2024-06-17 RX ORDER — TRANEXAMIC ACID 10 MG/ML
INJECTION, SOLUTION INTRAVENOUS AS NEEDED
Status: DISCONTINUED | OUTPATIENT
Start: 2024-06-17 | End: 2024-06-17

## 2024-06-17 RX ORDER — ONDANSETRON 4 MG/1
4 TABLET, ORALLY DISINTEGRATING ORAL EVERY 8 HOURS PRN
Status: CANCELLED | OUTPATIENT
Start: 2024-06-17

## 2024-06-17 RX ORDER — DIPHENHYDRAMINE HYDROCHLORIDE 50 MG/ML
INJECTION INTRAMUSCULAR; INTRAVENOUS AS NEEDED
Status: DISCONTINUED | OUTPATIENT
Start: 2024-06-17 | End: 2024-06-17

## 2024-06-17 RX ORDER — ASPIRIN 325 MG
325 TABLET, DELAYED RELEASE (ENTERIC COATED) ORAL 2 TIMES DAILY
Qty: 60 TABLET | Refills: 0 | Status: SHIPPED | OUTPATIENT
Start: 2024-06-17 | End: 2024-07-17

## 2024-06-17 RX ORDER — BUPIVACAINE HYDROCHLORIDE 7.5 MG/ML
INJECTION, SOLUTION INTRASPINAL AS NEEDED
Status: DISCONTINUED | OUTPATIENT
Start: 2024-06-17 | End: 2024-06-17

## 2024-06-17 RX ORDER — DOCUSATE SODIUM 100 MG/1
100 CAPSULE, LIQUID FILLED ORAL 2 TIMES DAILY PRN
Qty: 60 CAPSULE | Refills: 0 | Status: SHIPPED | OUTPATIENT
Start: 2024-06-17 | End: 2024-07-17

## 2024-06-17 RX ORDER — TRAMADOL HYDROCHLORIDE 50 MG/1
50 TABLET ORAL EVERY 6 HOURS PRN
Status: CANCELLED | OUTPATIENT
Start: 2024-06-17

## 2024-06-17 RX ORDER — PANTOPRAZOLE SODIUM 20 MG/1
20 TABLET, DELAYED RELEASE ORAL
Status: CANCELLED | OUTPATIENT
Start: 2024-06-17

## 2024-06-17 RX ORDER — ASCORBIC ACID 500 MG
500 TABLET ORAL 2 TIMES DAILY
Qty: 60 TABLET | Refills: 0 | Status: SHIPPED | OUTPATIENT
Start: 2024-06-17 | End: 2024-07-17

## 2024-06-17 RX ORDER — ASPIRIN 325 MG
325 TABLET, DELAYED RELEASE (ENTERIC COATED) ORAL 2 TIMES DAILY
Status: CANCELLED | OUTPATIENT
Start: 2024-06-17

## 2024-06-17 RX ORDER — MIDAZOLAM HYDROCHLORIDE 1 MG/ML
INJECTION INTRAMUSCULAR; INTRAVENOUS AS NEEDED
Status: DISCONTINUED | OUTPATIENT
Start: 2024-06-17 | End: 2024-06-17

## 2024-06-17 RX ORDER — OXYCODONE AND ACETAMINOPHEN 5; 325 MG/1; MG/1
1 TABLET ORAL EVERY 6 HOURS PRN
Qty: 28 TABLET | Refills: 0 | Status: SHIPPED | OUTPATIENT
Start: 2024-06-17

## 2024-06-17 RX ORDER — ACETAMINOPHEN 325 MG/1
975 TABLET ORAL EVERY 6 HOURS SCHEDULED
Status: CANCELLED | OUTPATIENT
Start: 2024-06-17

## 2024-06-17 RX ORDER — CEFAZOLIN SODIUM 2 G/100ML
2 INJECTION, SOLUTION INTRAVENOUS EVERY 8 HOURS
Status: CANCELLED | OUTPATIENT
Start: 2024-06-17 | End: 2024-06-17

## 2024-06-17 RX ORDER — FENTANYL CITRATE 50 UG/ML
INJECTION, SOLUTION INTRAMUSCULAR; INTRAVENOUS AS NEEDED
Status: DISCONTINUED | OUTPATIENT
Start: 2024-06-17 | End: 2024-06-17

## 2024-06-17 RX ORDER — SODIUM CHLORIDE, SODIUM LACTATE, POTASSIUM CHLORIDE, CALCIUM CHLORIDE 600; 310; 30; 20 MG/100ML; MG/100ML; MG/100ML; MG/100ML
100 INJECTION, SOLUTION INTRAVENOUS CONTINUOUS
Status: DISCONTINUED | OUTPATIENT
Start: 2024-06-17 | End: 2024-06-17 | Stop reason: HOSPADM

## 2024-06-17 RX ORDER — GABAPENTIN 300 MG/1
300 CAPSULE ORAL 3 TIMES DAILY PRN
Qty: 42 CAPSULE | Refills: 0 | Status: SHIPPED | OUTPATIENT
Start: 2024-06-17

## 2024-06-17 RX ORDER — CELECOXIB 200 MG/1
200 CAPSULE ORAL 2 TIMES DAILY PRN
Qty: 60 CAPSULE | Refills: 0 | Status: SHIPPED | OUTPATIENT
Start: 2024-06-17 | End: 2024-07-17

## 2024-06-17 RX ORDER — CARISOPRODOL 350 MG/1
350 TABLET ORAL 3 TIMES DAILY PRN
Status: CANCELLED | OUTPATIENT
Start: 2024-06-17

## 2024-06-17 RX ORDER — SODIUM CHLORIDE, SODIUM LACTATE, POTASSIUM CHLORIDE, CALCIUM CHLORIDE 600; 310; 30; 20 MG/100ML; MG/100ML; MG/100ML; MG/100ML
100 INJECTION, SOLUTION INTRAVENOUS CONTINUOUS
Status: CANCELLED | OUTPATIENT
Start: 2024-06-17 | End: 2024-06-18

## 2024-06-17 RX ORDER — CEFAZOLIN 1 G/1
INJECTION, POWDER, FOR SOLUTION INTRAVENOUS AS NEEDED
Status: DISCONTINUED | OUTPATIENT
Start: 2024-06-17 | End: 2024-06-17

## 2024-06-17 RX ORDER — DIPHENHYDRAMINE HYDROCHLORIDE 50 MG/ML
12.5 INJECTION INTRAMUSCULAR; INTRAVENOUS EVERY 6 HOURS PRN
Status: CANCELLED | OUTPATIENT
Start: 2024-06-17

## 2024-06-17 RX ORDER — KETOROLAC TROMETHAMINE 30 MG/ML
30 INJECTION, SOLUTION INTRAMUSCULAR; INTRAVENOUS EVERY 6 HOURS
Status: CANCELLED | OUTPATIENT
Start: 2024-06-17 | End: 2024-06-18

## 2024-06-17 RX ORDER — OXYCODONE HYDROCHLORIDE 5 MG/1
10 TABLET ORAL EVERY 6 HOURS PRN
Status: CANCELLED | OUTPATIENT
Start: 2024-06-17

## 2024-06-17 RX ORDER — MORPHINE SULFATE 2 MG/ML
2 INJECTION, SOLUTION INTRAMUSCULAR; INTRAVENOUS
Status: CANCELLED | OUTPATIENT
Start: 2024-06-17

## 2024-06-17 RX ORDER — OXYCODONE HYDROCHLORIDE 5 MG/1
5 TABLET ORAL EVERY 4 HOURS PRN
Status: DISCONTINUED | OUTPATIENT
Start: 2024-06-17 | End: 2024-06-17 | Stop reason: HOSPADM

## 2024-06-17 RX ORDER — NALOXONE HYDROCHLORIDE 0.4 MG/ML
0.2 INJECTION, SOLUTION INTRAMUSCULAR; INTRAVENOUS; SUBCUTANEOUS EVERY 5 MIN PRN
Status: CANCELLED | OUTPATIENT
Start: 2024-06-17

## 2024-06-17 RX ORDER — ONDANSETRON HYDROCHLORIDE 2 MG/ML
INJECTION, SOLUTION INTRAVENOUS AS NEEDED
Status: DISCONTINUED | OUTPATIENT
Start: 2024-06-17 | End: 2024-06-17

## 2024-06-17 RX ORDER — CEFAZOLIN SODIUM 2 G/100ML
2 INJECTION, SOLUTION INTRAVENOUS ONCE
Status: DISCONTINUED | OUTPATIENT
Start: 2024-06-17 | End: 2024-06-17 | Stop reason: HOSPADM

## 2024-06-17 RX ORDER — DROPERIDOL 2.5 MG/ML
0.62 INJECTION, SOLUTION INTRAMUSCULAR; INTRAVENOUS ONCE AS NEEDED
Status: DISCONTINUED | OUTPATIENT
Start: 2024-06-17 | End: 2024-06-17 | Stop reason: HOSPADM

## 2024-06-17 RX ORDER — ONDANSETRON HYDROCHLORIDE 2 MG/ML
4 INJECTION, SOLUTION INTRAVENOUS EVERY 8 HOURS PRN
Status: CANCELLED | OUTPATIENT
Start: 2024-06-17

## 2024-06-17 SDOH — HEALTH STABILITY: MENTAL HEALTH: CURRENT SMOKER: 0

## 2024-06-17 ASSESSMENT — COGNITIVE AND FUNCTIONAL STATUS - GENERAL
CLIMB 3 TO 5 STEPS WITH RAILING: A LITTLE
MOVING TO AND FROM BED TO CHAIR: A LITTLE
MOBILITY SCORE: 20
STANDING UP FROM CHAIR USING ARMS: A LITTLE
WALKING IN HOSPITAL ROOM: A LITTLE

## 2024-06-17 ASSESSMENT — PAIN - FUNCTIONAL ASSESSMENT: PAIN_FUNCTIONAL_ASSESSMENT: 0-10

## 2024-06-17 ASSESSMENT — PAIN SCALES - GENERAL
PAINLEVEL_OUTOF10: 0 - NO PAIN
PAINLEVEL_OUTOF10: 0 - NO PAIN
PAINLEVEL_OUTOF10: 7
PAINLEVEL_OUTOF10: 0 - NO PAIN
PAINLEVEL_OUTOF10: 0 - NO PAIN
PAIN_LEVEL: 2
PAINLEVEL_OUTOF10: 0 - NO PAIN
PAINLEVEL_OUTOF10: 0 - NO PAIN

## 2024-06-17 NOTE — HH CARE COORDINATION
Home Care received a Referral for Physical Therapy. We have processed the referral for a Start of Care on 6/18.     If you have any questions or concerns, please feel free to contact us at [phone]. Follow the prompts, enter your five digit zip code, and you will be directed to your care team on [facility] 1.

## 2024-06-17 NOTE — BRIEF OP NOTE
Date: 2024  OR Location: [facility] OR    Name: Susannah Avalos, : 1960, Age: 63 y.o., MRN: [id number], Sex: female    Diagnosis  Pre-op Diagnosis     * Primary osteoarthritis of right hip [M16.11] Post-op Diagnosis     * Primary osteoarthritis of right hip [M16.11]     Procedures  KATTY ROBOT ARTHROPLASTY TOTAL HIP ANTERIOR APPROACH  25151 - [place] ARTHRP ACETBLR/PROX FEM PROSTC AGRFT/ALGRFT      Surgeons      * Mohamud Santillan - Primary    Resident/Fellow/Other Assistant:  Nate Clayton PA-C    Procedure Summary  Anesthesia: Consult  ASA: II  Anesthesia Staff: Anesthesiologist: Rupesh Buckley MD  CRNA: MOLINA Olsen-CRNA  Estimated Blood Loss: 75mL  Intra-op Medications:   Administrations occurring from 0800 to 1030 on 24:   Medication Name Total Dose   EPINEPHrine (Adrenalin) 0.2 mL, ketorolac (Toradol) 30 mg, morphine PF (Duramorph) 2.5 mg, ropivacaine (Naropin) 5 mg/mL (0.5 %) 30 mL in sodium chloride 0.9% 20 mL syringe 56.2 mL   lactated Ringer's infusion 686.35 mL              Anesthesia Record               Intraprocedure I/O Totals          Intake    Tranexamic Acid 0.00 mL    The total shown is the total volume documented since Anesthesia Start was filed.    Propofol Drip 0.00 mL    The total shown is the total volume documented since Anesthesia Start was filed.    Total Intake 0 mL       Output    Est. Blood Loss 75 mL    Total Output 75 mL       Net    Net Volume -75 mL          Specimen: No specimens collected     Staff:   Circulator: Stephani Meyers Person: Carissa Salas Circulator: Rahel          Findings: See op note.    Complications:  None; patient tolerated the procedure well.     Disposition: PACU - hemodynamically stable.  Condition: stable  Specimens Collected: No specimens collected  Attending Attestation: I was present and scrubbed for the entire procedure.    Mohamud Santillan  Phone Number: [phone]

## 2024-06-17 NOTE — PROGRESS NOTES
Physical Therapy    Physical Therapy Evaluation    Patient Name: Susannah Avalos  MRN: [id number]  Today's Date: 6/17/2024   Time Calculation  Start Time: 1328  Stop Time: 1404  Time Calculation (min): 36 min    Assessment/Plan   PT Assessment  PT Assessment Results: Decreased mobility, Orthopedic restrictions, Pain  Rehab Prognosis: Excellent  Evaluation/Treatment Tolerance: Patient tolerated treatment well  Medical Staff Made Aware: Yes  Strengths: Ability to acquire knowledge, Support of Caregivers  End of Session Communication: Bedside nurse  End of Session Patient Position:  (Pt in her wheelchair waiting for her RN to remove her IV port and then discharge home with her brother)     PT Plan  PT Eval Only Reason: Safe to return home  PT Discharge Recommendations: Low intensity level of continued care  Equipment Recommended upon Discharge: Wheeled walker  PT Recommended Transfer Status: Independent  PT - OK to Discharge: Yes (LOW follow up services)      Subjective   General Visit Information:  General  Reason for Referral:  (62 yo femmalr admitted 2' to OA R hip, s/p DAA R RHONDA)  Referred By:  (Dr. ANA CRISTINA Santillan)  Past Medical History Relevant to Rehab:  (OA, CKD2, HLD, PONV)  Prior to Session Communication: Bedside nurse  Patient Position Received: Bed, 3 rail up, Alarm off, not on at start of session  Preferred Learning Style: verbal, visual, written  General Comment:  (Pt is pleasant and agreeable to work with PT. PT went over Pt's RHONDA precautions and Pt performed her RHONDA protocol ex's. Pt doesn't need further services here as she is D/C home with her brother today. Recommend LOW follow up services.)  Home Living:  Home Living  Type of Home: [place]  Lives With: Alone (Pt states that her brother will be staying with her for 4-5 days)  Home Adaptive Equipment: Walker rolling or standard, Cane  Home Layout: Two level (Pt states that she can stay on the 1st floor)  Home Access: Stairs to enter with rails (L wall to hold  on to)  Entrance Stairs-Number of Steps:  (3)  Bathroom Shower/Tub: Tub/shower unit  Bathroom Equipment: Raised toilet seat without rails  Prior Level of Function:  Prior Function Per Pt/Caregiver Report  Level of [place]: Independent with ADLs and functional transfers, Independent with homemaking with ambulation (no device)  Receives Help From: Family  Ambulatory Assistance: Independent  Precautions:  Precautions  LE Weight Bearing Status: Weight Bearing as Tolerated (R LE)  Post-Surgical Precautions: Right hip precautions  Vital Signs:       Objective   Pain:  Pain Assessment  Pain Assessment: 0-10  Pain Score: 7  Pain Type: Surgical pain  Pain Location: Hip  Pain Orientation: Right  Cognition:  Cognition  Overall Cognitive Status: Within Functional Limits    General Assessments:  General Observation  General Observation:  (Pt performed the following supine ex's; B AP, R QS, glut sets, R heel slides, R hip abd and R SAQ each x 20 reps)               Activity Tolerance  Endurance: Endurance does not limit participation in activity         Strength  Strength Comments:  (B UE and LE are WFL)  Static Sitting Balance  Static Sitting-Balance Support: Bilateral upper extremity supported  Static Sitting-Level of Assistance: Distant supervision    Static Standing Balance  Static Standing-Balance Support: Bilateral upper extremity supported (wheeled walker)  Static Standing-Level of Assistance: Close supervision  Dynamic Standing Balance  Dynamic Standing-Balance Support: Bilateral upper extremity supported (wheeled walker)  Dynamic Standing-Comments:  (close supervision)  Functional Assessments:  Bed Mobility  Bed Mobility: Yes  Bed Mobility 1  Bed Mobility 1: Supine to sitting, Sitting to supine  Level of Assistance 1: Distant supervision    Transfers  Transfer: Yes  Transfer 1  Transfer From 1: Bed to  Transfer to 1: Stand  Technique 1: Sit to stand, Stand to sit  Transfer Device 1:  (wheeled walker)  Transfer Level  of Assistance 1: Close supervision    Ambulation/Gait Training  Ambulation/Gait Training Performed: Yes  Ambulation/Gait Training 1  Surface 1: Level tile  Device 1: Rolling walker  Assistance 1: Close supervision  Quality of Gait 1: Decreased step length (Decreased tamiko)  Comments/Distance (ft) 1:  (100 feet)    Stairs  Stairs: No  Extremity/Trunk Assessments:  RUE   RUE : Within Functional Limits  LUE   LUE: Within Functional Limits  RLE   RLE : Within Functional Limits  LLE   LLE : Within Functional Limits  Outcome Measures:  [facility] Basic Mobility  Turning from your back to your side while in a flat bed without using bedrails: None  Moving from lying on your back to sitting on the side of a flat bed without using bedrails: None  Moving to and from bed to chair (including a wheelchair): A little  Standing up from a chair using your arms (e.g. wheelchair or bedside chair): A little  To walk in hospital room: A little  Climbing 3-5 steps with railing: A little  Basic Mobility - Total Score: 20        Education Documentation  Handouts, taught by Maxwell Wiggins PT at 6/17/2024  2:29 PM.  Learner: Patient  Readiness: Acceptance  Method: Explanation, Demonstration, Handout  Response: Verbalizes Understanding, Demonstrated Understanding    Education Comments  No comments found.

## 2024-06-17 NOTE — DISCHARGE INSTRUCTIONS
[facility]                          Mohamud Santillan,   Phone: [phone]    POSTOPERATIVE INSTRUCTIONS: TOTAL HIP & TOTAL KNEE ARTHROPLASTY    General/highlights: Flex/tighten the muscles in the buttocks, thighs and calves often when in chair or bed.  Utilize the incentive spirometer often especially the next 3 days.  Walk at least 10 steps every hour that you are awake.  Take stairs 1 at a time, good leg leads up, bed leg legs down, use the handrails.  You may be weightbearing as tolerated, in general the walker is used for 2 weeks.    PAIN, SWELLING & BRUISING  Some pain, stiffness and swelling is normal for up to 1 year after surgery.  Pain will start to let up over time depending on your activity level.  It is preferable to rest for 24 hours following your surgery  Pain is often delayed for 24-48 hours after surgery.  Pain may be dull/achy, throbbing, or even sharp/nerve sensations  It is normal for swelling and bruising to worsen before it gets better.  These symptoms usually peak 1 week after surgery  Swelling and bruising may appear throughout the leg, all the way down to your toes  Wear your compression stockings every day during the day for 14 days and remove them at night.  This will help control swelling    WOUND CARE INSTRUCTIONS  Your surgical bandage will be removed 2 weeks after surgery at your post-operative visit.  If your bandage becomes compromised, begins to come off before then, or soaks through with drainage call the office immediately.  You may have sutures under the skin that dissolve on their own over time.    As the sutures absorb occasionally a small suture abscess can develop, this is not uncommon for up to 6 weeks, if this occurs please notify your surgeon immediately.    HYGIENE  You may shower 24 hours after your surgery, provided the Mepilex silver dressing is in place.  No tub bathing or submerging underwater.  Do not scrub directly over the  surgical bandage  Do not use any creams, lotions or ointments on the surgical leg for 4 weeks after surgery, or until you have been cleared to do so by your surgeon    GENERAL INSTRUCTIONS  Do not drink alcoholic beverages (beer and wine included) for 24 hours following your surgery, or while you are taking narcotic pain medications  Delay making important decisions until you are fully recovered  You cannot swim or submerge in water for at least 6 weeks after surgery, or until you are cleared by your surgeon.  You may start kneeling 3 months after knee replacement surgery, once you have been cleared by your surgeon.  This may not ever feel “normal” or comfortable    HOME DIET  Resume your normal diet after surgery. If you are on a specific type of diet for your condition, resume that instead.    Choose foods that help promote good bowel habits and prevent constipation, such as foods high in fiber.          POSTOPERATIVE MEDICATIONS    Pain medications have been ordered to help manage pain throughout recovery.  While you are using narcotic pain medication, you should be using a stool softener or laxative to prevent constipation.  My preference is parallax powder once or twice a day when taking the narcotic pain medicine  It is important to eat a small meal or snack before taking pain medications to avoid nausea or stomach upset.    MEDICATION REFILLS - [phone]    If you need to request a medication refill, please call the office between 8:30am-4:30pm, Monday through Friday.    Any calls received outside of this timeframe will be handled on the next business day.    Medication requests received on Saturday or Sunday will be handled on Monday.    Please allow 3-5 business days for all medication requests to be processed.    RESTARTING HOME MEDICATIONS  You may restart your home medications the following day after your surgery UNLESS you have been given alternate instructions.    Follow the instructions given to  "you on your hospital discharge instructions for more information regarding your home medications.    ASSISTIVE DEVICE & MOVEMENT  Initially, you will use a walker or crutches to walk for the first 2 weeks.  Once your therapist feels you are ready, you will wean to one crutch or cane followed by no assistive device.  It is important to keep moving throughout recovery.  Walk at least 10 steps every hour that you are awake.  Stairs are part of your recovery, the \"good \"leg leads on the way up, the \"bad \"leg leads on the way down to, use the handrails and not the walker or crutches.  You should be up and walking around several times per day as well as bending your knee and making sure your knee is going completely straight (for knee replacements).  For anterior hip replacements avoid straight leg raise.    NUMBNESS/CLICKING    Decreased sensation or numbness is common on or around the area of the incision due to sensory nerves that are affected at the time of surgery.  The area of numbness will be lateral to the incision  You might always have numbness but the size of the area of numbness should decrease with time.  It is common for patients to have a “click” in the knee with movement.  This is usually nothing to be concerned about.  There are several reasons why your knee can be making these noises, including the implant components rubbing against each other, or a tendons going over a bony prominence.  Grinding can also occur and is not out of the ordinary.  Grinding can be caused by scar tissue formation  Noises will often settle over time once muscle strength improves.    DIFFICULTY SLEEPING    It is very common for patients to have difficulty sleeping at night which can be caused pain, medication, or feeling of anxiety.  Sleep disturbance typically worsens 4-6 weeks after surgery.  You are permitted to sleep on your back or side with a  pillow between your legs for comfort            DRIVING & TRAVEL  Your surgeon " will address this at your post-op appointment.  You must not be taking narcotic pain medication to be cleared to drive.  LEFT LEG JOINT REPLACMENT:  You may drive once you have regained full control of your leg, typically around 2 week after surgery  RIGHT LEG JOINT REPLACEMENT:  You must speak with your surgeon before you resume driving.  Driving can typically be resumed by 4-6 weeks after surgery.  During long distance travel, you should attempt to change position or stand every hour.  You should complete ankle pumps throughout your travel if you are sitting for long periods of time.  If traveling within the first 2 weeks after surgery, you should wear your compression stockings.    DENTAL & OTHER PROCEDURES    All patients must wait a minimum of 3 months for elective procedures, including routine dental cleanings.  For any dental appointment - cleaning or dental procedures - patients must take a prophylactic antibiotic 1 hour before the appointment.  You will also need to call for an antibiotic prior to any other invasive test, procedure, or surgery.  These prophylactic antibiotics will be needed for the rest of your life, in order to prevent infections.  Please call your surgeons office at [phone] to request the antibiotic.      PHYSICAL THERAPY    Following surgery it is important to progress through recovery with in-home or outpatient physical therapy.  You should continue to complete home exercises provided from the hospital on days that you are not working with a physical therapist.    It is common to have a temporary increase in pain and swelling upon starting outpatient physical therapy and/or changing your exercise routine.  Continue to use ice to help with symptoms.     FOLLOW-UP APPOINTMENT - [phone]    Your post-surgical appointments will take place approximately 2 weeks, 6 weeks, 3 months and 1 year following surgery.  Joint replacements are monitored thereafter every 2-5 years for life.  If  you have any questions or need to make changes to this appointment, please contact the office:    EMERGENCIES & WHEN TO CALL YOUR SURGEON  When to contact our office immediately:  Any falls or injury to the joint replacement  Fever >101.5 for at least 48 hours after surgery or chills.  Excessive bleeding from incision(s). A small amount of drainage is normal and expected.  Signs of infection of incision(s)-excessive drainage that is soaking through your dressing (especially if it is pus-like), redness that is spreading out from the edges of your incision, or increased warmth around the area.  Excruciating pain for which the pain medication, taken as instructed, is not helping.  Severe calf pain.  Go directly to the emergency room or call 911, if you are experiencing chest pain or difficulty breathing.              ICE/COLD THERAPY  Ice is most important during the first 2 weeks after surgery, but should be used for several weeks as needed.  Never place ice, or cold therapy devices directly on the skin.  You should always have a protective layer between your skin and the cold.  You have been prescribed to ice your total joint at a minimum of twice per hour for 20 minutes while awake during the first 6 weeks after surgery if you are using ice packs. This will help with pain control.  If you are using an ice machine, please follow ice machine instructions.  After knee replacement is extremely important to elevate the leg straight on an incline, not flat.    COLD THERAPY MACHINE RECOMMENDATIONS      Cold therapy devices can be used before and after surgery to assist in comfort and help to reduce pain and swelling.  These devices differ from ice or ice packs whereas the mechanism circulates water through tubing and a pad to provide longer periods of cold therapy to the desired site.  While in the hospital, you can use your cold devices around the clock for optimal comfort.  We recommend using cold therapy after working  with therapy or completing exercises on your own.  Once you are discharged home, there is no set schedule in which you must follow while using cold therapy.  Below are a few points to remember when using a cold therapy device:    Read the 's instructions prior to first the use.  Follow instructions for filling the cooler (water first, then ice).  Always make sure there is a layer of protection between the cold pad and your skin (Clothing, Towel, Ace Bandage, etc.)  Allow the device to circulate cold water throughout the pad prior wrapping the pad around your leg (approximately 10 minutes).  Place the pad on your leg in the desired position to meet your pain management needs and use the wraps provided to secure the pad to your body.  The purpose of this device is to use consistently throughout the day.  You do not need to need to use the 20 on, 20 off method when using an ice machine.  During waking hours, remove the cold pad every 1-2 hours to perform a skin check  Detach the pad from the cooler and ambulate at least once every hour  After removing the pad, allow at least 30 minutes before resuming cold therapy  You may wear the cold therapy device during periods of sleep including overnight    If you wake up during the night, you can check the skin at this time.  You do not need to wake up specifically to perform skin checks.  Empty the cooler and pad when device is not in use.  Follow 's instructions for cleaning your cold therapy device.    [facility] can assist with problems related to products purchased through the hospital  [phone] - Debra   or   [phone] - Sigrid    Sample Medication Schedule  [facility]    Medication Refills - [phone] - Monday through Friday 8:30am-4:30pm  Please allow 3-5 days for medication refill requests to be processed.

## 2024-06-17 NOTE — DISCHARGE SUMMARY
Discharge Diagnosis  Status post total hip replacement, right    Issues Requiring Follow-Up  PO R DAA RHONDA    Test Results Pending At Discharge  Pending Labs       Order Current Status    VERIFY ABO/Rh Group Test Collected (06/17/24 6215)            Hospital Course   The patient is a pleasant 63 year old female who underwent an elective right direct anterior approach total hip arthroplasty using KATTY performed by Dr. Santillan at [facility] on 6/17/2024.  Patient had a routine uncomplicated preoperative, intraoperative and immediate postoperative surgical course.  As planned postoperatively the patient was admitted to the regular nursing floor at [facility].  While in the regular nursing floor patient received consultations from both internal medicine as well as physical therapy.  Patient received preoperative and postoperative intravenous antibiotics.  Pain control is with a combination of both oral and IV narcotic and nonnarcotic medications.  DVT prophylaxis was with sequentials early ambulation and aspirin therapy.  Anticoagulation medications will be continued for minimum of 30 days postsurgery.  Patient was discharged home with home physical therapy and home health care outpatient follow-up is being arranged for 2 weeks in the outpatient clinic postdischarge.      Pertinent Physical Exam At Time of Discharge  Physical Exam  Cardiovascular:      Rate and Rhythm: Normal rate.   Pulmonary:      Breath sounds: Normal breath sounds.   Abdominal:      Palpations: Abdomen is soft.   Musculoskeletal:         General: Swelling and tenderness present.      Right lower leg: Edema present.      Comments: PO bandages appreciated on operative side.   Neurological:      Mental Status: She is alert.         Home Medications     Medication List      START taking these medications     ascorbic acid 500 mg tablet; Commonly known as: Vitamin C; Take 1 tablet   (500 mg) by mouth 2 times a day.   aspirin 325 mg EC  tablet; Take 1 tablet (325 mg) by mouth 2 times a day.   carisoprodol 350 mg tablet; Commonly known as: Soma; Take 1 tablet (350   mg) by mouth 3 times a day as needed for muscle spasms.   celecoxib 200 mg capsule; Commonly known as: CeleBREX; Take 1 capsule   (200 mg) by mouth 2 times a day as needed for mild pain (1 - 3).   docusate sodium 100 mg capsule; Commonly known as: Colace; Take 1   capsule (100 mg) by mouth 2 times a day as needed for constipation.   gabapentin 300 mg capsule; Commonly known as: Neurontin; Take 1 capsule   (300 mg) by mouth 3 times a day as needed (Nerve pain).   oxyCODONE-acetaminophen 5-325 mg tablet; Commonly known as: Percocet;   Take 1 tablet by mouth every 6 hours if needed for severe pain (7 - 10).     CONTINUE taking these medications     omeprazole 20 mg DR capsule; Commonly known as: PriLOSEC   pantoprazole 40 mg EC tablet; Commonly known as: ProtoNix     STOP taking these medications     multivitamin tablet       Outpatient Follow-Up  FU in 2 weeks at Jess Clayton PA-C

## 2024-06-17 NOTE — ANESTHESIA PREPROCEDURE EVALUATION
Patient: Susannah Avalos    Procedure Information       Date/Time: 24 0800    Procedure: KATTY ROBOT ARTHROPLASTY TOTAL HIP ANTERIOR APPROACH (Right: Hip)    [facility] /  [facility]    Surgeons: Mohamud Santillan DO     Vitals:    24 0618   BP: (!) 162/102   Pulse: 84   Resp: 16   Temp: 36.7 °C (98.1 °F)   SpO2: 97%       Past Surgical History:   Procedure Laterality Date     SECTION, LOW TRANSVERSE      COLONOSCOPY      X2     Past Medical History:   Diagnosis Date    Arthritis     Primary osteoarthritis of right hip    CKD (chronic kidney disease) stage 2, GFR 60-89 ml/min     GERD (gastroesophageal reflux disease)     Hyperlipidemia     PONV (postoperative nausea and vomiting)     Vitamin D deficiency     Wears contact lenses     Wears glasses        Current Facility-Administered Medications:     ceFAZolin in dextrose (iso-os) (Ancef) IVPB 2 g, 2 g, intravenous, Once, Nate Clayton PA-C    lactated Ringer's infusion, 100 mL/hr, intravenous, Continuous, Noe Echavarria MD, Last Rate: 100 mL/hr at 24, 100 mL/hr at 24    tranexamic acid (Cyklokapron) bolus from bag 781 mg, 10 mg/kg, intravenous, Once, Nate Clayton PA-C  Prior to Admission medications    Medication Sig Start Date End Date Taking? Authorizing Provider   multivitamin tablet Take 1 tablet by mouth once daily.   Yes Historical Provider, MD   omeprazole (PriLOSEC) 20 mg DR capsule Take 1 capsule (20 mg) by mouth once daily at bedtime. Do not crush or chew.   Yes Historical Provider, MD   pantoprazole (ProtoNix) 40 mg EC tablet Take 1 tablet (40 mg) by mouth once daily in the morning. Take before meals. Do not crush, chew, or split.   Yes Historical Provider, MD   ascorbic acid (Vitamin C) 500 mg tablet Take 1 tablet (500 mg) by mouth 2 times a day. 24  Nate Clayton PA-C   aspirin 325 mg EC tablet Take 1 tablet (325 mg) by mouth 2 times a day. 24  Nate Clayton PA-C  "  carisoprodol (Soma) 350 mg tablet Take 1 tablet (350 mg) by mouth 3 times a day as needed for muscle spasms. 24   Nate Clayton PA-C   celecoxib (CeleBREX) 200 mg capsule Take 1 capsule (200 mg) by mouth 2 times a day as needed for mild pain (1 - 3). 24  Nate Clayton PA-C   docusate sodium (Colace) 100 mg capsule Take 1 capsule (100 mg) by mouth 2 times a day as needed for constipation. 24  Ntae Clayton PA-C   gabapentin (Neurontin) 300 mg capsule Take 1 capsule (300 mg) by mouth 3 times a day as needed (Nerve pain). 24   Nate Clayton PA-C   oxyCODONE-acetaminophen (Percocet) 5-325 mg tablet Take 1 tablet by mouth every 6 hours if needed for severe pain (7 - 10). 24   Nate Clayton PA-C     No Known Allergies  Social History     Tobacco Use    Smoking status: Former     Current packs/day: 0.00     Types: Cigarettes     Quit date:      Years since quittin.4    Smokeless tobacco: Never   Substance Use Topics    Alcohol use: Yes     Comment: occasionally         Chemistry    Lab Results   Component Value Date/Time     2024 1421    K 3.8 2024 1421     2024 1421    CO2 31 2024 1421    BUN 11 2024 1421    CREATININE 0.78 2024 1421    Lab Results   Component Value Date/Time    CALCIUM 9.3 2024 1421    ALKPHOS 94 2024 1421    AST 19 2024 1421    ALT 17 2024 1421    BILITOT 0.6 2024 1421          Lab Results   Component Value Date/Time    WBC 7.5 2024 1421    HGB 13.5 2024 1421    HCT 40.3 2024 1421     2024 1421     No results found for: \"PROTIME\", \"PTT\", \"INR\"  Encounter Date: 06/03/24   ECG 12 Lead   Result Value    Ventricular Rate 76    Atrial Rate 76    FL Interval 138    QRS Duration 82    QT Interval 382    QTC Calculation(Bazett) 429    P Axis 51    R Axis 2    T Axis 26    QRS Count 13    Q Onset 223    P Onset 154    P Offset 206    T Offset 414    QTC " Jerry 413    Narrative    Normal sinus rhythm  Minimal voltage criteria for LVH, may be normal variant  Cannot rule out Anterior infarct , age undetermined  Abnormal ECG  No previous ECGs available  Confirmed by Emmanuel Willson ([id number]) on 6/6/2024 11:48:56 AM     No results found for this or any previous visit from the past 1095 days.         Relevant Problems   Musculoskeletal   (+) Unilateral primary osteoarthritis, right hip       Clinical information reviewed:    Allergies  Meds  Problems              NPO Detail:  NPO/Void Status  Date of Last Liquid: 06/16/24  Time of Last Liquid: 2230  Date of Last Solid: 06/16/24  Time of Last Solid: 2200         Physical Exam    Airway  Mallampati: II  TM distance: >3 FB     Cardiovascular - normal exam     Dental    Pulmonary - normal exam     Abdominal - normal exam           Anesthesia Plan    History of general anesthesia?: yes  History of complications of general anesthesia?: no    ASA 2     spinal     The patient is not a current smoker.    Anesthetic plan and risks discussed with patient.  Use of blood products discussed with patient who.    Plan discussed with CRNA and attending.

## 2024-06-17 NOTE — OP NOTE
KATTY ROBOT ARTHROPLASTY TOTAL HIP ANTERIOR APPROACH (R) Operative Note     Date: 2024  OR Location: A OR    Name: Susannah Avalos, : 1960, Age: 63 y.o., MRN: [id number], Sex: female    Diagnosis  Pre-op Diagnosis     * Primary osteoarthritis of right hip [M16.11] Post-op Diagnosis     * Primary osteoarthritis of right hip [M16.11]     Procedures  KATTY ROBOT ARTHROPLASTY TOTAL HIP ANTERIOR APPROACH  77131 - MD ARTHRP ACETBLR/PROX FEM PROSTC AGRFT/ALGRFT      Surgeons      * Mohamud Santillan - Primary    Resident/Fellow/Other Assistant:  Nate Clayton PA-C    Procedure Summary  Anesthesia: Spinal anesthetic, MAC sedation ASA: II  Anesthesia Staff: Anesthesiologist: Rupesh Buckley MD  CRNA: MOLINA Olsen-CRNA  Estimated Blood Loss: 75 mL  Intra-op Medications:   Administrations occurring from 0800 to 1030 on 24:   Medication Name Total Dose   EPINEPHrine (Adrenalin) 0.2 mL, ketorolac (Toradol) 30 mg, morphine PF (Duramorph) 2.5 mg, ropivacaine (Naropin) 5 mg/mL (0.5 %) 30 mL in sodium chloride 0.9% 20 mL syringe 56.2 mL   lactated Ringer's infusion 686.35 mL              Anesthesia Record               Intraprocedure I/O Totals          Intake    Tranexamic Acid 0.00 mL    The total shown is the total volume documented since Anesthesia Start was filed.    Propofol Drip 0.00 mL    The total shown is the total volume documented since Anesthesia Start was filed.    Total Intake 0 mL       Output    Est. Blood Loss 75 mL    Total Output 75 mL       Net    Net Volume -75 mL          Specimen: No specimens collected     Staff:   Circulator: Stephani Meyers Person: Carissa Salas Circulator: Rahel         Drains and/or Catheters: * None in log *    Tourniquet Times:         Implants:  Implants       Type Name Action Serial No.      Joint INSERT, TRIDENT X3 POLYETHYLENE, 0 DEG, 32MM D - [id number] Implanted      Joint SHELL, TRIDENT II, CLUSTERHOLE, HA 50D - [id number] Implanted      Joint HEAD,  FEMUR V40 32MM 0MM BIOLOX DELTA - -4-457 - [id number] Implanted [id number]     Orthopedic Implant INSIGNIA HIP STEM-HIGH OFFSET Implanted [id number]              Findings: See dictation below    Indications: Susannah Avalos is an 63 y.o. female who is having surgery for Primary osteoarthritis of right hip [M16.11].  In the form of a right Roel assisted direct anterior approach total arthroplasty    The patient was seen in the preoperative area. The risks, benefits, complications, treatment options, non-operative alternatives, expected recovery and outcomes were discussed with the patient. The possibilities of reaction to medication, pulmonary aspiration, injury to surrounding structures, bleeding, recurrent infection, the need for additional procedures, failure to diagnose a condition, and creating a complication requiring transfusion or operation were discussed with the patient. The patient concurred with the proposed plan, giving informed consent.  The site of surgery was properly noted/marked if necessary per policy. The patient has been actively warmed in preoperative area. Preoperative antibiotics have been ordered and given within 1 hours of incision. Venous thrombosis prophylaxis have been ordered including bilateral sequential compression devices    Procedure Details: Date of surgery: 6/17/2024    Location: [facility]    Pre-Operative Diagnosis: Right hip end-stage bone-on-bone osteoarthritis    Post-Operative Diagnosis: Right hip end-stage bone-on-bone osteoarthritis    Procedure: Right Roel assisted direct anterior approach total hip arthroplasty    Implants:  1.  Shital insignia 132 degree femoral stem-size 3, high offset  2.  McConnell Trident II acetabular cup-size 50 code D  3.  Shital X.3, 0 degree nonlipid polyethylene liner-size 32 code D  4.  Biolox delta ceramic femoral head-size 32+0 mm length    Surgeon: Mohamud Santillan DO    First Assistant: Nate Clayton  TAMMY    Intraoperative pathology and findings/operative indications:  The patient is a pleasant 63-year-old female longstanding history of worsening right hip pain progressing to the point that a great interfered with her quality of life.  Patient admitted to difficulty going from sit to stand difficulty navigating uneven ground difficulty putting on shoes and socks occasional difficulty sleeping at night difficulty crossing her legs.  She has failed extensive conservative management.  Physical exam revealed antalgic gait shortened stance phase on the right as compared to left obligate external rotation with flexion severely ankylosed central compartment hip range of motion negative Trendelenburg stance.  Preoperative radiographs revealed severe bone-on-bone osteoarthritic changes of the right hip complete joint space loss subchondral cystic changes subchondral sclerosis marginal osteophytic formation around the acetabular rim as well as the femoral head neck junction.  With continued pain and disability related to her right hip that greatly interfered with her quality of life ultimately the patient did choose to move forward with total hip arthroplasty.  At the time of the procedure exam under anesthesia revealed a overall shorter femoral length on the right but overall limb lengths were reasonably equal indicating a slightly longer tibial length on the right.  Obligate external rotation with flexion of the right hip appreciated external rotation contracture appreciated.  Intraoperatively the patient was found to have a hypertrophic hip capsule consistent with its ankylosed nature hypertrophic arthritic related synovitis noted throughout the joint.  Hypertrophic osteophytic formation around the femoral head neck junction as well as the acetabular rim.  Calcified macerated labral tissue noted circumferentially complete articular cartilage loss noted about the weightbearing aspect of the femoral head and the  acetabulum.  Bone quality was excellent.  There was no indication of infection at time of the procedure.    Procedure in detail:  The patient was correctly identified and marked in preoperative holding, risks benefits alternatives and reasonable expectations for outcomes have previously been discussed.  The patient was then escorted to operative suite #4 at [facility], once in the operative suite surgical pause/time-out was performed, preoperative antibiotics were administered and spinal anesthetic with MAC Sedation was provided by the Department of Anesthesia.  The patient was positioned supine on a radiolucent operative table, all bony prominences well padded.  Hips were positioned over the break in the bed.  Bilateral hips and lower extremities were then sterilely prepped and draped from umbilicus to ankles.  The surgical window was cut through the drapes over the level of the right hip, anatomic landmarks were identified and marked with sterile marking pen and this area was covered with an Ioban.  A ten blade was then used to created three stab insions over the ipsilateral iliac crest, 3 array pins then placed and array positioned.  At this time a 10 blade was used to make a standard 10 centimeter incision starting 3 centimeters lateral to the anterior superior iliac spine carried distally and obliquely aiming towards the lateral femoral epicondyle.  Careful dissection through subcutaneous tissues utilizing Bovie cautery to achieve hemostasis was performed until the investing fascia of tensor fascia remington was identified.  This was then incised in line with the incision.  Muscle belly of the tensor fascia remington was retracted laterally and the superior extracapsular retractor was inserted.  Circumflex vessels were identified and coagulated with the Aquamantys.  The prerectus fascia/fascia no name was then incised with Bovie cautery.  The inferior extracapsular retractor was then inserted.  Pre capsular  fat was sharply excised with Bovie cautery.  Direct and reflected head of rectus femoris was then elevated off the anterior hip capsule with a Phillips elevator an anterior acetabular retractor was inserted.  A standard Z arthrotomy of the anterior hip capsule was performed tagging the superior and inferior leaflets.  The extracapsular retractors were then moved intracapsularly.  Neck cut measurements obtained with the yocasta system, landmarks marked with bovie cautery.  A standard step cut of the anterior femoral neck was then performed after anatomic landmarks had been identified and measurement obtained with the YOCASTA system.  Wafer of femoral neck bone was removed and the native femoral head was then removed with a corkscrew on power.  Acetabular retractors were inserted acetabular labrum was sharply excised circumferentially with a 10 blade.  Acetabular osteophytes removed with a rongeur.  The pulvinar was coagulated with the Aquamantys and then sharply excised with Bovie cautery.  The wound was then copiously irrigated with sterile saline via simpulse lavage  The native femoral head measured 48 millimeters in diameter.  Registration of the hip then performed in standard fashion and yocasta arm assisted ream performed with a size 50 reamer as per our pre-operative plan.  At this point a size 50 acetabular cup was malleted into position.  The wound was then copiously irrigated with sterile saline via Simpulse lavage.  Size 32 polyethylene liner was then malleted into the locking mechanism and found to have excellent fixation.  Yocasta array and pins were then removed.   At this point the femur was positioned for preparation.  Superior capsular release was completed and femoral elevation was achieved.  Femoral retractors were inserted the bed was transitioned into slight Trendelenburg in slight foot down position.  A box chisel was passed, followed by a curved curette and suction tip to function as canal finer followed by a  lateralizing rasp.  At this point sequential broaching of the femoral canal was performed utilizing a automated broaching device starting with a size 0 broach all the way up to a size 3 broach.  At this point a trial 132 degree high offset neck was attached with a 32+0 millimeter length trial head.  The bed was leveled and open reduction of the hip was performed.  Leg lengths assesed  with palpated at the level of the patella and medial malleoli and intraoperative Galeazzi exam was performed.   At this point leg lengths were tested and found to be similar to the preoperative exam with a shorter femoral length on Galeazzi exam and overall equal limb length at the level of the medial malleoli.  Stability of the hip was then inspected with hyperextension, hyperextension with external rotation hyperflexion, hyperflexion with internal rotation, hyperflexion with adduction past midline and internal rotation, the position of sleep as well as figure of 4.  The hip was found to be inherently quite stable.  At this point trial components were openly dislocated.  Repeat femoral exposure was achieved and trial components were removed.  Final implants were then impacted into place and found to have excellent fixation.  Final open reduction of the hip was performed leg length and stability were again assessed and found to be equal to that of the trial femoral components.   At this point the wound was once again copiously irrigated with sterile saline and dilute sterile betadine.  The tag sutures about the anterior hip capsule were then removed and the anterior hip capsule was closed with a 0 Vicryl ligature in interrupted figure-of-eight fashion.  The pericapsular pain injection was then provided.  Investing fascia of the tensor fascia remington was then closed with a 0 Vicryl ligature in running locked fashion.  Deep subcutaneous tissues were closed with a 2 0 Vicryl ligature in buried interrupted fashion.  Skin was reapproximated  with 4 0 Monocryl in running subcuticular fashion followed by application of Dermabond.  Once the Dermabond was completely dried a self adherent Mepilex Silver dressing was applied over top the wound followed by a towel an iceman cooler.  Array Pin sites closed with surgical glue, steristrips and a islet dressing.  The patient was then woken,  transferred to a hospital bed and returned to PACU in stable condition.  Counts were correct case was clean elective complications were none specimens were none estimated blood loss was 75 cubic centimeters.      Complications:  None; patient tolerated the procedure well.    Disposition: PACU - hemodynamically stable.  Condition: stable         Additional Details:     Attending Attestation: I performed the procedure.    Mohamud Santillan  Phone Number: [phone]

## 2024-06-17 NOTE — ANESTHESIA POSTPROCEDURE EVALUATION
Patient: Susannah Avalos    Procedure Summary       Date: 06/17/24 Room / Location: [facility] OR 04 / [facility]    Anesthesia Start: 0758 Anesthesia Stop: 1031    Procedure: KATTY ROBOT ARTHROPLASTY TOTAL HIP ANTERIOR APPROACH (Right: Hip) Diagnosis:       Primary osteoarthritis of right hip      (Primary osteoarthritis of right hip [M16.11])    Surgeons: Mohamud Santillan DO Responsible Provider: Rupesh Buckley MD    Anesthesia Type: spinal ASA Status: 2            Anesthesia Type: spinal    Vitals Value Taken Time   /86 06/17/24 1105   Temp 36 °C (96.8 °F) 06/17/24 1035   Pulse 78 06/17/24 1105   Resp 17 06/17/24 1105   SpO2 98 % 06/17/24 1105       Anesthesia Post Evaluation    Patient location during evaluation: PACU  Patient participation: complete - patient participated  Level of consciousness: awake  Pain score: 2  Pain management: adequate  Multimodal analgesia pain management approach  Airway patency: patent  Two or more strategies used to mitigate risk of obstructive sleep apnea  Cardiovascular status: acceptable  Respiratory status: acceptable  Hydration status: acceptable  Postoperative Nausea and Vomiting: none    No notable events documented.

## 2024-06-17 NOTE — ANESTHESIA PROCEDURE NOTES
Spinal Block    Patient location during procedure: OR  Start time: 6/17/2024 8:04 AM  End time: 6/17/2024 8:11 AM  Reason for block: primary anesthetic and at surgeon's request  Staffing  Performed: CRNA   Authorized by: Rupesh Buckley MD    Performed by: MOLINA Olsen-CRNA    Preanesthetic Checklist  Completed: patient identified, IV checked, site marked, risks and benefits discussed, surgical consent, monitors and equipment checked, pre-op evaluation, timeout performed and sterile techniques followed  Block Timeout  RN/Licensed healthcare professional reads aloud to the Anesthesia provider and entire team: Patient identity, procedure with side and site, patient position, and as applicable the availability of implants/special equipment/special requirements.  Patient on coagulant treatment: no  Timeout performed at: 6/17/2024 7:57 AM  Spinal Block  Patient position: sitting  Prep: Betadine  Sterility prep: cap, drape, gloves, gown, hand hygiene and mask  Sedation level: moderate sedation  Patient monitoring: continuous pulse oximetry  Approach: midline  Vertebral space: L4-5  Injection technique: single-shot  Needle  Needle type: pencil-point   Needle gauge: 24 G  Needle length: 4 in  Free flowing CSF: yes    Assessment  Sensory level: T10  Block outcome: patient comfortable  Procedure assessment: patient sedated but conversant throughout procedure and patient tolerated procedure well with no immediate complications

## 2024-06-18 ENCOUNTER — HOME CARE VISIT (OUTPATIENT)
Dept: HOME HEALTH SERVICES | Facility: HOME HEALTH | Age: 64
End: 2024-06-18
Payer: COMMERCIAL

## 2024-06-18 VITALS
TEMPERATURE: 96.9 F | RESPIRATION RATE: 16 BRPM | HEART RATE: 65 BPM | OXYGEN SATURATION: 95 % | DIASTOLIC BLOOD PRESSURE: 70 MMHG | SYSTOLIC BLOOD PRESSURE: 122 MMHG | BODY MASS INDEX: 29.66 KG/M2 | HEIGHT: 65 IN | WEIGHT: 178 LBS

## 2024-06-18 PROCEDURE — 0023 HH SOC

## 2024-06-18 PROCEDURE — G0151 HHCP-SERV OF PT,EA 15 MIN: HCPCS

## 2024-06-18 SDOH — HEALTH STABILITY: PHYSICAL HEALTH
EXERCISE COMMENTS: ISSUED AND PERFORMED THE FOLLOWING THERAEX 10 REPS AS FOLLOWS:    SUPINE  ISOMETRIC QUADS  ISOMETRIC GLUTS  ANKLE PUMPS  SAQ  HIP ABDUCTION  HEEL SLIDES    SITTING  ANKLE PUMPS  LAQ  ISOM HIP ADDUCTION  ACTIVE ABDUCTION

## 2024-06-18 SDOH — HEALTH STABILITY: PHYSICAL HEALTH: EXERCISE TYPE: ANTERIOR THR HEP IN SIT AND SUPINE

## 2024-06-18 ASSESSMENT — ENCOUNTER SYMPTOMS
PERSON REPORTING PAIN: PATIENT
LOWEST PAIN SEVERITY IN PAST 24 HOURS: 4/10
MUSCLE WEAKNESS: 1
PAIN LOCATION - RELIEVING FACTORS: ICE AND MEDS
PAIN LOCATION - EXACERBATING FACTORS: WALKING
PAIN LOCATION: RIGHT HIP
PAIN LOCATION - PAIN SEVERITY: 4/10
PAIN: 1
LIMITED RANGE OF MOTION: 1
PAIN LOCATION - PAIN FREQUENCY: INTERMITTENT
HIGHEST PAIN SEVERITY IN PAST 24 HOURS: 9/10
PAIN LOCATION - PAIN DURATION: SHORT
SUBJECTIVE PAIN PROGRESSION: WAXING AND WANING
PAIN SEVERITY GOAL: 3/10
PAIN LOCATION - PAIN QUALITY: TIGHTNESS

## 2024-06-18 ASSESSMENT — ACTIVITIES OF DAILY LIVING (ADL)
ENTERING_EXITING_HOME: MINIMUM ASSIST
CURRENT_FUNCTION: STAND BY ASSIST
AMBULATION ASSISTANCE: STAND BY ASSIST
PHYSICAL TRANSFERS ASSESSED: 1
AMBULATION ASSISTANCE ON FLAT SURFACES: 1
AMBULATION ASSISTANCE: 1
OASIS_M1830: 03
DRESSING_UB_CURRENT_FUNCTION: SUPERVISION
DRESSING_LB_CURRENT_FUNCTION: STAND BY ASSIST

## 2024-06-20 ENCOUNTER — HOME CARE VISIT (OUTPATIENT)
Dept: HOME HEALTH SERVICES | Facility: HOME HEALTH | Age: 64
End: 2024-06-20
Payer: COMMERCIAL

## 2024-06-20 PROCEDURE — G0151 HHCP-SERV OF PT,EA 15 MIN: HCPCS

## 2024-06-20 ASSESSMENT — ENCOUNTER SYMPTOMS
PAIN SEVERITY GOAL: 1/10
PAIN LOCATION - EXACERBATING FACTORS: WALKING
PAIN LOCATION - PAIN FREQUENCY: INTERMITTENT
PERSON REPORTING PAIN: PATIENT
LOWEST PAIN SEVERITY IN PAST 24 HOURS: 2/10
PAIN: 1
HIGHEST PAIN SEVERITY IN PAST 24 HOURS: 5/10
PAIN LOCATION - RELIEVING FACTORS: ICE MEDS
PAIN LOCATION: RIGHT HIP
PAIN LOCATION - PAIN QUALITY: DULL AND ACHY
SUBJECTIVE PAIN PROGRESSION: WAXING AND WANING
PAIN LOCATION - PAIN SEVERITY: 2/10

## 2024-06-21 ENCOUNTER — TELEPHONE (OUTPATIENT)
Dept: INPATIENT UNIT | Facility: HOSPITAL | Age: 64
End: 2024-06-21
Payer: COMMERCIAL

## 2024-06-24 ENCOUNTER — HOME CARE VISIT (OUTPATIENT)
Dept: HOME HEALTH SERVICES | Facility: HOME HEALTH | Age: 64
End: 2024-06-24
Payer: COMMERCIAL

## 2024-06-24 PROCEDURE — G0151 HHCP-SERV OF PT,EA 15 MIN: HCPCS

## 2024-06-24 ASSESSMENT — ENCOUNTER SYMPTOMS
HIGHEST PAIN SEVERITY IN PAST 24 HOURS: 5/10
LOWEST PAIN SEVERITY IN PAST 24 HOURS: 2/10
PAIN SEVERITY GOAL: 1/10
PAIN LOCATION - RELIEVING FACTORS: ICE AND MEDS
PAIN LOCATION: RIGHT HIP
SUBJECTIVE PAIN PROGRESSION: WAXING AND WANING
PERSON REPORTING PAIN: PATIENT
PAIN LOCATION - EXACERBATING FACTORS: PROLONGED SITTING
PAIN LOCATION - PAIN SEVERITY: 2/10
PAIN LOCATION - PAIN FREQUENCY: INTERMITTENT
PAIN: 1

## 2024-06-27 ENCOUNTER — HOME CARE VISIT (OUTPATIENT)
Dept: HOME HEALTH SERVICES | Facility: HOME HEALTH | Age: 64
End: 2024-06-27
Payer: COMMERCIAL

## 2024-06-27 PROCEDURE — G0151 HHCP-SERV OF PT,EA 15 MIN: HCPCS

## 2024-06-27 ASSESSMENT — ACTIVITIES OF DAILY LIVING (ADL)
PHYSICAL TRANSFERS ASSESSED: 1
TOILETING: 1
BATHING ASSESSED: 1
CURRENT_FUNCTION: INDEPENDENT
DRESSING_LB_CURRENT_FUNCTION: INDEPENDENT
TOILETING: INDEPENDENT
AMBULATION ASSISTANCE: 1
BATHING_CURRENT_FUNCTION: INDEPENDENT
DRESSING_UB_CURRENT_FUNCTION: INDEPENDENT
AMBULATION ASSISTANCE: SUPERVISION

## 2024-06-27 ASSESSMENT — ENCOUNTER SYMPTOMS
PAIN LOCATION: RIGHT HIP
LOWEST PAIN SEVERITY IN PAST 24 HOURS: 2/10
PERSON REPORTING PAIN: PATIENT
PAIN LOCATION - PAIN SEVERITY: 2/10
SUBJECTIVE PAIN PROGRESSION: WAXING AND WANING
PAIN SEVERITY GOAL: 1/10
PAIN LOCATION - PAIN FREQUENCY: INTERMITTENT
PAIN: 1
PAIN LOCATION - PAIN QUALITY: DULL AND ACHY
PAIN LOCATION - RELIEVING FACTORS: ICE AND MEDS
HIGHEST PAIN SEVERITY IN PAST 24 HOURS: 3/10

## 2024-07-03 ENCOUNTER — HOME CARE VISIT (OUTPATIENT)
Dept: HOME HEALTH SERVICES | Facility: HOME HEALTH | Age: 64
End: 2024-07-03
Payer: COMMERCIAL

## 2024-07-03 PROCEDURE — G0151 HHCP-SERV OF PT,EA 15 MIN: HCPCS

## 2024-07-03 SDOH — HEALTH STABILITY: PHYSICAL HEALTH: EXERCISE TYPE: R ANTERIOR HIP

## 2024-07-03 SDOH — HEALTH STABILITY: PHYSICAL HEALTH
EXERCISE COMMENTS: SUPINE  ISOMETRIC QUADS  ISOMETRIC GLUTS  ANKLE PUMPS  SAQ  HIP ABDUCTION  HEEL SLIDES    SITTING  ANKLE PUMPS  LAQ  ISOM HIP ADDUCTION  ACTIVE ABDUCTION    STANDING  TOE RAISES  HIP ABDUCTION  MARCHING  HAMSTRING CURLS

## 2024-07-03 ASSESSMENT — ACTIVITIES OF DAILY LIVING (ADL)
TOILETING: INDEPENDENT
DRESSING_LB_CURRENT_FUNCTION: INDEPENDENT
DRESSING_UB_CURRENT_FUNCTION: INDEPENDENT
AMBULATION ASSISTANCE ON FLAT SURFACES: 1
GROOMING ASSESSED: 1
CURRENT_FUNCTION: INDEPENDENT
GROOMING_CURRENT_FUNCTION: INDEPENDENT
HOME_HEALTH_OASIS: 00
TOILETING: 1
OASIS_M1830: 00
AMBULATION ASSISTANCE: 1
AMBULATION ASSISTANCE: INDEPENDENT
PHYSICAL TRANSFERS ASSESSED: 1
AMBULATION ASSISTANCE ON UNEVEN SURFACES: 1

## 2024-07-03 ASSESSMENT — ENCOUNTER SYMPTOMS
PAIN LOCATION - RELIEVING FACTORS: ICE AND MEDS
PAIN LOCATION - PAIN QUALITY: DULL
HIGHEST PAIN SEVERITY IN PAST 24 HOURS: 4/10
PERSON REPORTING PAIN: PATIENT
PAIN: 1
PAIN SEVERITY GOAL: 2/10
PAIN LOCATION - PAIN SEVERITY: 4/10
PAIN LOCATION: RIGHT HIP
SUBJECTIVE PAIN PROGRESSION: WAXING AND WANING
MUSCLE WEAKNESS: 1
PAIN LOCATION - EXACERBATING FACTORS: WALKING
LOWEST PAIN SEVERITY IN PAST 24 HOURS: 2/10

## 2024-09-30 ENCOUNTER — HOSPITAL ENCOUNTER (OUTPATIENT)
Dept: RADIOLOGY | Facility: HOSPITAL | Age: 64
Discharge: HOME | End: 2024-09-30
Payer: COMMERCIAL

## 2024-09-30 VITALS — HEIGHT: 65 IN | BODY MASS INDEX: 29.16 KG/M2 | WEIGHT: 175 LBS

## 2024-09-30 DIAGNOSIS — Z12.31 ENCOUNTER FOR SCREENING MAMMOGRAM FOR MALIGNANT NEOPLASM OF BREAST: ICD-10-CM

## 2024-09-30 PROCEDURE — 77067 SCR MAMMO BI INCL CAD: CPT

## 2024-09-30 PROCEDURE — 77067 SCR MAMMO BI INCL CAD: CPT | Performed by: RADIOLOGY

## 2024-09-30 PROCEDURE — 77063 BREAST TOMOSYNTHESIS BI: CPT | Performed by: RADIOLOGY

## (undated) DEVICE — IRRIGATION SYSTEM, WOUND, PULSAVAC PLUS

## (undated) DEVICE — DRAPE PACK, TOTAL HIP, CUSTOM, GEAUGA

## (undated) DEVICE — NEEDLE, SPINAL, QUINCKE, 18 G X 3.5 IN, PINK HUB

## (undated) DEVICE — DRESSING, MEPILEX BORDER, POST-OP AG, 4 X 10 IN

## (undated) DEVICE — GLOVE, SURGICAL, PROTEXIS NEOPRENE, 8.5, PF, LF

## (undated) DEVICE — PIN, BONE, 4MM X 140MM, STERILE

## (undated) DEVICE — RETRACTOR, CORDLESS, RADIALUX, LIGHTED

## (undated) DEVICE — GOWN, SURGICAL, IMPERVIOUS, BREATHABLE, XXLARGE

## (undated) DEVICE — SUTURE, VICRYL, 1, 36 IN, CT-1, UNDYED

## (undated) DEVICE — DRESSING, GAUZE, SPONGE, KERLIX, SUPER, 6 X 6.75 IN, STERILE 10PK

## (undated) DEVICE — DRAPE, SHEET, U, W/ADHESIVE STRIP, IMPERVIOUS, 60 X 70 IN, DISPOSABLE, LF, STERILE

## (undated) DEVICE — IRRIGATION SYSTEM, WOUND, SURGIPHOR, 450ML, STERILE

## (undated) DEVICE — SUTURE, V-LOC, 3-0, 23IN, P-12, 90 ABS

## (undated) DEVICE — SUTURE, CTD, VICRYL, 2-0, UND, BR, CT-2

## (undated) DEVICE — SUTURE, ETHIBOND, 2, 30 IN, CT2, GREEN

## (undated) DEVICE — APPLICATOR, CHLORAPREP, W/ORANGE TINT, 26ML

## (undated) DEVICE — DRAPE KIT, RIO

## (undated) DEVICE — GLOVE, SURGICAL, PROTEXIS NEOPRENE, 8.0, PF, LF

## (undated) DEVICE — TIP, SUCTION, YANKAUER, BULB, ADULT

## (undated) DEVICE — COVER, TABLE, 44X90

## (undated) DEVICE — STOCKINETTE, IMPERVIOUS, 12 X 48 IN, LF, STERILE

## (undated) DEVICE — KIT, MINOR, DOUBLE BASIN

## (undated) DEVICE — ELECTRODE, ELECTROSURGICAL, BLADE, NONSTICK, MODIFIED, 6.5 IN X 165 MM

## (undated) DEVICE — BLADE, OSCILLATOR 19.5 X 86 X 1.27MM

## (undated) DEVICE — SEALER, BIPOLAR, AQUA MANTYS 6.0

## (undated) DEVICE — BANDAGE, COFLEX, 6 X 5 YDS, FOAM TAN, STERILE, LF

## (undated) DEVICE — SUCTION TIP , YANKAUER, W/BULB SUCTION

## (undated) DEVICE — TRAY, FOLEY, DRAIN BAG, SURESTEP, 16FR, W/STATLOCK

## (undated) DEVICE — TRACKING KIT, HIP PROCEDURES, VIZADISC

## (undated) DEVICE — ADHESIVE, SKIN, LIQUIBAND EXCEED

## (undated) DEVICE — SYRINGE, 60 CC, LUER LOCK, MONOJECT, W/O CAP, LF